# Patient Record
Sex: MALE | Race: WHITE | NOT HISPANIC OR LATINO | Employment: FULL TIME | URBAN - METROPOLITAN AREA
[De-identification: names, ages, dates, MRNs, and addresses within clinical notes are randomized per-mention and may not be internally consistent; named-entity substitution may affect disease eponyms.]

---

## 2024-10-15 ENCOUNTER — APPOINTMENT (OUTPATIENT)
Dept: PRIMARY CARE | Facility: CLINIC | Age: 54
End: 2024-10-15
Payer: COMMERCIAL

## 2024-10-15 VITALS
BODY MASS INDEX: 29.99 KG/M2 | HEART RATE: 68 BPM | WEIGHT: 221.4 LBS | HEIGHT: 72 IN | SYSTOLIC BLOOD PRESSURE: 160 MMHG | DIASTOLIC BLOOD PRESSURE: 82 MMHG | TEMPERATURE: 98.2 F | OXYGEN SATURATION: 95 %

## 2024-10-15 DIAGNOSIS — D17.23 LIPOMA OF RIGHT LOWER EXTREMITY: Primary | ICD-10-CM

## 2024-10-15 PROCEDURE — 3008F BODY MASS INDEX DOCD: CPT | Performed by: FAMILY MEDICINE

## 2024-10-15 PROCEDURE — 99203 OFFICE O/P NEW LOW 30 MIN: CPT | Performed by: FAMILY MEDICINE

## 2024-10-15 ASSESSMENT — PATIENT HEALTH QUESTIONNAIRE - PHQ9
SUM OF ALL RESPONSES TO PHQ9 QUESTIONS 1 AND 2: 0
1. LITTLE INTEREST OR PLEASURE IN DOING THINGS: NOT AT ALL
2. FEELING DOWN, DEPRESSED OR HOPELESS: NOT AT ALL

## 2024-10-15 NOTE — PROGRESS NOTES
"Subjective   Patient ID: Gopal Moreau is a 54 y.o. male who presents for Establish Care and Leg Pain.    HPI     Patient is here to establish care with you. He also complains of a lump on the back of his right leg. He said he has had it years, in the last 6 months it has become painful.     Review of Systems   All other systems reviewed and are negative.      Objective   /82   Pulse 68   Temp 36.8 °C (98.2 °F) (Oral)   Ht 1.816 m (5' 11.5\")   Wt 100 kg (221 lb 6.4 oz)   SpO2 95%   BMI 30.45 kg/m²     Physical Exam  Constitutional:       Appearance: Normal appearance.   Eyes:      Conjunctiva/sclera: Conjunctivae normal.   Cardiovascular:      Rate and Rhythm: Normal rate and regular rhythm.   Pulmonary:      Effort: Pulmonary effort is normal.      Breath sounds: Normal breath sounds.   Abdominal:      Palpations: Abdomen is soft.   Musculoskeletal:         General: Normal range of motion.   Skin:     General: Skin is warm and dry.      Comments: Pt has a large solid subcutaneous mass on the posterior leg consistent with lipoma. This is a mobile, rubbery, non-tender, non-erythematous mass. Size approximately 4-5 cm in diameter   Neurological:      Mental Status: He is alert.   Psychiatric:         Mood and Affect: Mood normal.         Assessment/Plan   Diagnoses and all orders for this visit:  Lipoma of right lower extremity  -     Referral to General Surgery; Future       "

## 2024-10-31 ENCOUNTER — APPOINTMENT (OUTPATIENT)
Facility: CLINIC | Age: 54
End: 2024-10-31
Payer: COMMERCIAL

## 2024-10-31 VITALS
SYSTOLIC BLOOD PRESSURE: 170 MMHG | OXYGEN SATURATION: 98 % | WEIGHT: 224 LBS | HEART RATE: 67 BPM | BODY MASS INDEX: 30.34 KG/M2 | DIASTOLIC BLOOD PRESSURE: 94 MMHG | HEIGHT: 72 IN

## 2024-10-31 DIAGNOSIS — D17.23 LIPOMA OF RIGHT LOWER EXTREMITY: Primary | ICD-10-CM

## 2024-10-31 PROCEDURE — 99204 OFFICE O/P NEW MOD 45 MIN: CPT | Performed by: SURGERY

## 2024-10-31 PROCEDURE — 3008F BODY MASS INDEX DOCD: CPT | Performed by: SURGERY

## 2024-10-31 RX ORDER — CEFAZOLIN SODIUM 2 G/100ML
2 INJECTION, SOLUTION INTRAVENOUS ONCE
OUTPATIENT
Start: 2024-10-31 | End: 2024-10-31

## 2024-10-31 ASSESSMENT — ENCOUNTER SYMPTOMS
PALPITATIONS: 0
NAUSEA: 0
FEVER: 0
HEADACHES: 0
VOMITING: 0
DIZZINESS: 0
DIARRHEA: 0
CHILLS: 0
COUGH: 0
CONSTIPATION: 0
ABDOMINAL PAIN: 0
BLOOD IN STOOL: 0
SHORTNESS OF BREATH: 0

## 2024-11-15 ENCOUNTER — ANESTHESIA EVENT (OUTPATIENT)
Dept: OPERATING ROOM | Facility: HOSPITAL | Age: 54
End: 2024-11-15
Payer: COMMERCIAL

## 2024-11-21 ENCOUNTER — CLINICAL SUPPORT (OUTPATIENT)
Dept: PREADMISSION TESTING | Facility: HOSPITAL | Age: 54
End: 2024-11-21
Payer: COMMERCIAL

## 2024-11-21 NOTE — PREPROCEDURE INSTRUCTIONS
Medication List            Accurate as of November 21, 2024  2:13 PM. Always use your most recent med list.                aspirin-acetaminophen-caffeine 250-250-65 mg tablet  Commonly known as: Excedrin Migraine  Additional Medication Adjustments for Surgery: Take last dose 7 days before surgery                          NPO Instructions:     Do not drink any liquid after midnight the night before your surgery  Do not eat any food after midnight the night before your surgery/procedure.  Candy, gum, mints and smoking of cigarettes, marijuana or vaping is not permitted after midnight prior to your surgery   Do not drink Alcohol 24 hours prior to surgery      Increase fluid intake day before surgery    Additional Instructions:      Review your medication instructions, take indicated medications    Wear  comfortable loose fitting clothing  Do not use moisturizers, creams, lotions or perfume  All jewelry and valuables should be left at home. May bring glasses and partials.    Stop blood thinning medications as instructed by ordering physician or surgeon    Shower or bathe the night before or day of surgery. Use surgical soap instructed per Dr. Maria.    Brush teeth and avoid perfumes, colognes, powders, makeup, aftershave and hair spray    Go to Registration, in the main lobby, upon arrival on the day of surgery and have 's license and medical insurance card available.    Call [phone] the day before your surgery/procedure to find out what time you are to arrive the next day.     Please have a responsible adult to drive you home and be available to help you as needed after surgery.    NPO Instructions:    Do not eat any food after midnight the night before your surgery/procedure.

## 2024-12-05 ENCOUNTER — ANESTHESIA (OUTPATIENT)
Dept: OPERATING ROOM | Facility: HOSPITAL | Age: 54
End: 2024-12-05
Payer: COMMERCIAL

## 2024-12-05 ENCOUNTER — HOSPITAL ENCOUNTER (OUTPATIENT)
Facility: HOSPITAL | Age: 54
Setting detail: OUTPATIENT SURGERY
Discharge: HOME | End: 2024-12-05
Attending: SURGERY | Admitting: SURGERY
Payer: COMMERCIAL

## 2024-12-05 VITALS
HEART RATE: 65 BPM | WEIGHT: 224 LBS | SYSTOLIC BLOOD PRESSURE: 172 MMHG | OXYGEN SATURATION: 100 % | RESPIRATION RATE: 12 BRPM | HEIGHT: 71 IN | TEMPERATURE: 98.4 F | BODY MASS INDEX: 31.36 KG/M2 | DIASTOLIC BLOOD PRESSURE: 94 MMHG

## 2024-12-05 DIAGNOSIS — D17.23 LIPOMA OF RIGHT LOWER EXTREMITY: Primary | ICD-10-CM

## 2024-12-05 LAB
ANION GAP SERPL CALC-SCNC: 14 MMOL/L (ref 10–20)
BUN SERPL-MCNC: 11 MG/DL (ref 6–23)
CALCIUM SERPL-MCNC: 9.5 MG/DL (ref 8.6–10.3)
CHLORIDE SERPL-SCNC: 100 MMOL/L (ref 98–107)
CO2 SERPL-SCNC: 25 MMOL/L (ref 21–32)
CREAT SERPL-MCNC: 0.91 MG/DL (ref 0.5–1.3)
EGFRCR SERPLBLD CKD-EPI 2021: >90 ML/MIN/1.73M*2
ERYTHROCYTE [DISTWIDTH] IN BLOOD BY AUTOMATED COUNT: 12.4 % (ref 11.5–14.5)
GLUCOSE SERPL-MCNC: 99 MG/DL (ref 74–99)
HCT VFR BLD AUTO: 48.8 % (ref 41–52)
HGB BLD-MCNC: 17.6 G/DL (ref 13.5–17.5)
MCH RBC QN AUTO: 30.6 PG (ref 26–34)
MCHC RBC AUTO-ENTMCNC: 36.1 G/DL (ref 32–36)
MCV RBC AUTO: 85 FL (ref 80–100)
NRBC BLD-RTO: 0 /100 WBCS (ref 0–0)
PLATELET # BLD AUTO: 317 X10*3/UL (ref 150–450)
POTASSIUM SERPL-SCNC: 4.1 MMOL/L (ref 3.5–5.3)
RBC # BLD AUTO: 5.76 X10*6/UL (ref 4.5–5.9)
SODIUM SERPL-SCNC: 135 MMOL/L (ref 136–145)
WBC # BLD AUTO: 10.3 X10*3/UL (ref 4.4–11.3)

## 2024-12-05 PROCEDURE — 2500000004 HC RX 250 GENERAL PHARMACY W/ HCPCS (ALT 636 FOR OP/ED): Performed by: NURSE ANESTHETIST, CERTIFIED REGISTERED

## 2024-12-05 PROCEDURE — 7100000002 HC RECOVERY ROOM TIME - EACH INCREMENTAL 1 MINUTE: Performed by: SURGERY

## 2024-12-05 PROCEDURE — 88304 TISSUE EXAM BY PATHOLOGIST: CPT | Performed by: STUDENT IN AN ORGANIZED HEALTH CARE EDUCATION/TRAINING PROGRAM

## 2024-12-05 PROCEDURE — 3600000008 HC OR TIME - EACH INCREMENTAL 1 MINUTE - PROCEDURE LEVEL THREE: Performed by: SURGERY

## 2024-12-05 PROCEDURE — 2500000004 HC RX 250 GENERAL PHARMACY W/ HCPCS (ALT 636 FOR OP/ED): Mod: JZ | Performed by: SURGERY

## 2024-12-05 PROCEDURE — 7100000009 HC PHASE TWO TIME - INITIAL BASE CHARGE: Performed by: SURGERY

## 2024-12-05 PROCEDURE — 12032 INTMD RPR S/A/T/EXT 2.6-7.5: CPT | Performed by: SURGERY

## 2024-12-05 PROCEDURE — 7100000010 HC PHASE TWO TIME - EACH INCREMENTAL 1 MINUTE: Performed by: SURGERY

## 2024-12-05 PROCEDURE — 2500000005 HC RX 250 GENERAL PHARMACY W/O HCPCS: Performed by: SURGERY

## 2024-12-05 PROCEDURE — 2500000004 HC RX 250 GENERAL PHARMACY W/ HCPCS (ALT 636 FOR OP/ED): Performed by: ANESTHESIOLOGY

## 2024-12-05 PROCEDURE — 7100000001 HC RECOVERY ROOM TIME - INITIAL BASE CHARGE: Performed by: SURGERY

## 2024-12-05 PROCEDURE — 36415 COLL VENOUS BLD VENIPUNCTURE: CPT | Performed by: ANESTHESIOLOGY

## 2024-12-05 PROCEDURE — 2500000001 HC RX 250 WO HCPCS SELF ADMINISTERED DRUGS (ALT 637 FOR MEDICARE OP): Performed by: ANESTHESIOLOGY

## 2024-12-05 PROCEDURE — 82374 ASSAY BLOOD CARBON DIOXIDE: CPT | Performed by: ANESTHESIOLOGY

## 2024-12-05 PROCEDURE — 3600000003 HC OR TIME - INITIAL BASE CHARGE - PROCEDURE LEVEL THREE: Performed by: SURGERY

## 2024-12-05 PROCEDURE — 2720000007 HC OR 272 NO HCPCS: Performed by: SURGERY

## 2024-12-05 PROCEDURE — 3700000001 HC GENERAL ANESTHESIA TIME - INITIAL BASE CHARGE: Performed by: SURGERY

## 2024-12-05 PROCEDURE — 85027 COMPLETE CBC AUTOMATED: CPT | Performed by: ANESTHESIOLOGY

## 2024-12-05 PROCEDURE — 88304 TISSUE EXAM BY PATHOLOGIST: CPT | Mod: TC,PORLAB | Performed by: SURGERY

## 2024-12-05 PROCEDURE — 3700000002 HC GENERAL ANESTHESIA TIME - EACH INCREMENTAL 1 MINUTE: Performed by: SURGERY

## 2024-12-05 PROCEDURE — 11406 EXC TR-EXT B9+MARG >4.0 CM: CPT | Performed by: SURGERY

## 2024-12-05 RX ORDER — CEFAZOLIN SODIUM 2 G/100ML
2 INJECTION, SOLUTION INTRAVENOUS ONCE
Status: COMPLETED | OUTPATIENT
Start: 2024-12-05 | End: 2024-12-05

## 2024-12-05 RX ORDER — LIDOCAINE HCL/PF 100 MG/5ML
SYRINGE (ML) INTRAVENOUS AS NEEDED
Status: DISCONTINUED | OUTPATIENT
Start: 2024-12-05 | End: 2024-12-05

## 2024-12-05 RX ORDER — ACETAMINOPHEN 325 MG/1
975 TABLET ORAL ONCE
Status: COMPLETED | OUTPATIENT
Start: 2024-12-05 | End: 2024-12-05

## 2024-12-05 RX ORDER — ACETAMINOPHEN 500 MG
1000 TABLET ORAL EVERY 6 HOURS PRN
Qty: 24 TABLET | Refills: 0 | Status: SHIPPED | OUTPATIENT
Start: 2024-12-05 | End: 2024-12-08

## 2024-12-05 RX ORDER — SODIUM CITRATE AND CITRIC ACID MONOHYDRATE 334; 500 MG/5ML; MG/5ML
30 SOLUTION ORAL ONCE
Status: COMPLETED | OUTPATIENT
Start: 2024-12-05 | End: 2024-12-05

## 2024-12-05 RX ORDER — ONDANSETRON HYDROCHLORIDE 2 MG/ML
4 INJECTION, SOLUTION INTRAVENOUS ONCE
Status: COMPLETED | OUTPATIENT
Start: 2024-12-05 | End: 2024-12-05

## 2024-12-05 RX ORDER — METOCLOPRAMIDE HYDROCHLORIDE 5 MG/ML
10 INJECTION INTRAMUSCULAR; INTRAVENOUS ONCE
Status: COMPLETED | OUTPATIENT
Start: 2024-12-05 | End: 2024-12-05

## 2024-12-05 RX ORDER — PROPOFOL 10 MG/ML
INJECTION, EMULSION INTRAVENOUS AS NEEDED
Status: DISCONTINUED | OUTPATIENT
Start: 2024-12-05 | End: 2024-12-05

## 2024-12-05 RX ORDER — FENTANYL CITRATE 50 UG/ML
INJECTION, SOLUTION INTRAMUSCULAR; INTRAVENOUS AS NEEDED
Status: DISCONTINUED | OUTPATIENT
Start: 2024-12-05 | End: 2024-12-05

## 2024-12-05 RX ORDER — MORPHINE SULFATE 2 MG/ML
2 INJECTION, SOLUTION INTRAMUSCULAR; INTRAVENOUS EVERY 5 MIN PRN
Status: DISCONTINUED | OUTPATIENT
Start: 2024-12-05 | End: 2024-12-05 | Stop reason: HOSPADM

## 2024-12-05 RX ORDER — SODIUM CHLORIDE 9 MG/ML
20 INJECTION, SOLUTION INTRAVENOUS CONTINUOUS
Status: DISCONTINUED | OUTPATIENT
Start: 2024-12-05 | End: 2024-12-05 | Stop reason: HOSPADM

## 2024-12-05 RX ORDER — FAMOTIDINE 10 MG/ML
20 INJECTION INTRAVENOUS ONCE
Status: COMPLETED | OUTPATIENT
Start: 2024-12-05 | End: 2024-12-05

## 2024-12-05 RX ORDER — MIDAZOLAM HYDROCHLORIDE 1 MG/ML
INJECTION, SOLUTION INTRAMUSCULAR; INTRAVENOUS AS NEEDED
Status: DISCONTINUED | OUTPATIENT
Start: 2024-12-05 | End: 2024-12-05

## 2024-12-05 RX ORDER — KETOROLAC TROMETHAMINE 30 MG/ML
INJECTION, SOLUTION INTRAMUSCULAR; INTRAVENOUS AS NEEDED
Status: DISCONTINUED | OUTPATIENT
Start: 2024-12-05 | End: 2024-12-05

## 2024-12-05 RX ORDER — MORPHINE SULFATE 4 MG/ML
4 INJECTION INTRAVENOUS EVERY 5 MIN PRN
Status: DISCONTINUED | OUTPATIENT
Start: 2024-12-05 | End: 2024-12-05 | Stop reason: HOSPADM

## 2024-12-05 RX ORDER — SODIUM CHLORIDE 0.9 G/100ML
IRRIGANT IRRIGATION AS NEEDED
Status: DISCONTINUED | OUTPATIENT
Start: 2024-12-05 | End: 2024-12-05 | Stop reason: HOSPADM

## 2024-12-05 RX ORDER — ALBUTEROL SULFATE 0.83 MG/ML
2.5 SOLUTION RESPIRATORY (INHALATION) ONCE
Status: DISCONTINUED | OUTPATIENT
Start: 2024-12-05 | End: 2024-12-05 | Stop reason: HOSPADM

## 2024-12-05 RX ORDER — ONDANSETRON HYDROCHLORIDE 2 MG/ML
4 INJECTION, SOLUTION INTRAVENOUS ONCE AS NEEDED
Status: DISCONTINUED | OUTPATIENT
Start: 2024-12-05 | End: 2024-12-05 | Stop reason: HOSPADM

## 2024-12-05 SDOH — HEALTH STABILITY: MENTAL HEALTH: CURRENT SMOKER: 1

## 2024-12-05 ASSESSMENT — PAIN SCALES - GENERAL
PAINLEVEL_OUTOF10: 0 - NO PAIN
PAINLEVEL_OUTOF10: 3
PAIN_LEVEL: 0
PAINLEVEL_OUTOF10: 0 - NO PAIN

## 2024-12-05 ASSESSMENT — ENCOUNTER SYMPTOMS
DIZZINESS: 0
BLOOD IN STOOL: 0
NAUSEA: 0
ABDOMINAL PAIN: 0
HEADACHES: 0
COUGH: 0
VOMITING: 0
FEVER: 0
CHILLS: 0
DIARRHEA: 0
CONSTIPATION: 0
SHORTNESS OF BREATH: 0
PALPITATIONS: 0

## 2024-12-05 ASSESSMENT — PAIN DESCRIPTION - DESCRIPTORS: DESCRIPTORS: DISCOMFORT;SORE

## 2024-12-05 ASSESSMENT — COLUMBIA-SUICIDE SEVERITY RATING SCALE - C-SSRS
6. HAVE YOU EVER DONE ANYTHING, STARTED TO DO ANYTHING, OR PREPARED TO DO ANYTHING TO END YOUR LIFE?: NO
2. HAVE YOU ACTUALLY HAD ANY THOUGHTS OF KILLING YOURSELF?: NO
1. IN THE PAST MONTH, HAVE YOU WISHED YOU WERE DEAD OR WISHED YOU COULD GO TO SLEEP AND NOT WAKE UP?: NO

## 2024-12-05 ASSESSMENT — PAIN - FUNCTIONAL ASSESSMENT
PAIN_FUNCTIONAL_ASSESSMENT: 0-10
PAIN_FUNCTIONAL_ASSESSMENT: 0-10

## 2024-12-05 NOTE — ANESTHESIA POSTPROCEDURE EVALUATION
Patient: Gopal Moreau    Procedure Summary       Date: 12/05/24 Room / Location: [facility] OR 02 / Virtual [facility]    Anesthesia Start: 1509 Anesthesia Stop: 1618    Procedure: Excision of right posterior thigh soft tissue mass (Right) Diagnosis:       Lipoma of right lower extremity      (Lipoma of right lower extremity [D17.23])    Surgeons: Francie Maria MD Responsible Provider: TRE Key    Anesthesia Type: MAC ASA Status: 2            Anesthesia Type: MAC    Vitals Value Taken Time   /88 12/05/24 1630   Temp 36.9 °C (98.4 °F) 12/05/24 1620   Pulse 59 12/05/24 1639   Resp 8 12/05/24 1639   SpO2 99 % 12/05/24 1639   Vitals shown include unfiled device data.    Anesthesia Post Evaluation    Patient location during evaluation: PACU  Patient participation: complete - patient participated  Level of consciousness: awake and alert  Pain score: 0  Pain management: adequate  Airway patency: patent  Cardiovascular status: hemodynamically stable  Respiratory status: room air  Hydration status: acceptable  Postoperative Nausea and Vomiting: none    No notable events documented.     Refilled per protocol

## 2024-12-05 NOTE — OP NOTE
Excision of right posterior thigh soft tissue mass Operative Note     Date: 2024  OR Location: [facility]    Name: Gopal Moreau, : 1970, Age: 54 y.o., MRN: [id number], Sex: male    Diagnosis  Pre-op Diagnosis      * Lipoma of right lower extremity [D17.23] Post-op Diagnosis     * Lipoma of right lower extremity [D17.23]     Procedures  Excision of right posterior thigh soft tissue mass  30436 - CO EXC B9 LESION MRGN XCP SK TG T/A/L >4.0 CM  Intermediate wound closure, 6cm    Surgeons      * Francie Maria - Primary    Resident/Fellow/Other Assistant:  Surgeons and Role:     * Dhiraj Hoffman MD - Resident - Assisting    Staff:   Janusz: Abdirizak Coreas Person: Zo Canales Circulator: Isela Canales Circulator: Corina    Anesthesia Staff: CRNA: LIU Key-CRNA    Procedure Summary  Anesthesia: Monitor Anesthesia Care  ASA: II  Estimated Blood Loss: 5mL  Intra-op Medications: Administrations occurring from 1335 to 1435 on 24:  * No intraprocedure medications in log *           Anesthesia Record               Intraprocedure I/O Totals       None           Specimen:   ID Type Source Tests Collected by Time   1 : SOFT TISSUE MASS RIGHT THIGH Tissue SOFT TISSUE MASS RESECTION SURGICAL PATHOLOGY EXAM Francie Maria MD 2024 1547        Findings: soft tissue mass measuring 4.5 x 4 x 3cm    Indications: Gopal Moreau is an 54 y.o. male who is having surgery for Lipoma of right lower extremity [D17.23].     The patient was seen in the preoperative area. The risks, benefits, complications, treatment options, non-operative alternatives, expected recovery and outcomes were discussed with the patient. The possibilities of reaction to medication, pulmonary aspiration, injury to surrounding structures, bleeding, recurrent infection, the need for additional procedures, failure to diagnose a condition, and creating a complication requiring transfusion or operation were discussed with the patient.  The patient concurred with the proposed plan, giving informed consent.  The site of surgery was properly noted/marked if necessary per policy. The patient has been actively warmed in preoperative area. Preoperative antibiotics have been ordered and given within 1 hours of incision. Venous thrombosis prophylaxis have been ordered including bilateral sequential compression devices and chemical prophylaxis    Procedure Details:   The patient was taken to the operating room and underwent monitored anesthesia care. The patient was placed in left lateral decubitus position and all pressure points were padded. The right posterolateral thigh was prepped and draped in the usual sterile fashion. A time-out was performed with all parties present.     Local anesthesia was given using 0.25% Marcaine. A 6cm elliptical incision was made over the mass. Blunt dissection and electrocautery were used to carefully dissect around the mass. It was removed and measured 4.5 x 4 x 3cm in size. The wound bed was carefully irrigated and hemostasis was achieved. The incision was closed with 2-0 and 3-0 Vicryl in an interrupted fashion and 4-0 Vicryl in a running fashion. It was sealed with Liquiband.    The patient was awoken and taken to recovery in stable condition. All needle, sponge and instrument counts were correct at the end of the case. I was present for the entire case.     Complications:  None; patient tolerated the procedure well.    Disposition: PACU - hemodynamically stable.  Condition: stable     Attending Attestation: I was present and scrubbed for the entire procedure.    Francie Maria  Phone Number: [phone]

## 2024-12-05 NOTE — ANESTHESIA PREPROCEDURE EVALUATION
Patient: Gopal Moreau    Procedure Information       Date/Time: 12/05/24 1335    Procedure: Excision of right posterior thigh soft tissue mass (Right) - 30 minutes    Location: [facility] 02 / [facility]    Surgeons: Francie Maria MD            Relevant Problems   Anesthesia (within normal limits)       Clinical information reviewed:   Tobacco  Allergies  Meds  Problems  Med Hx  Surg Hx   Fam Hx  Soc   Hx        NPO Detail:  NPO/Void Status  Date of Last Liquid: 12/04/24  Time of Last Liquid: 2100  Date of Last Solid: 12/04/24  Time of Last Solid: 2100  Last Intake Type: Clear fluids         Physical Exam    Airway  Mallampati: I  TM distance: >3 FB  Neck ROM: full     Cardiovascular - normal exam  Rhythm: regular  Rate: normal     Dental   Comments: MISSING SOME UPPER AND LOWER   Pulmonary - normal exam  Breath sounds clear to auscultation     Abdominal   (+) obese  Abdomen: soft         Anesthesia Plan    History of general anesthesia?: yes  History of complications of general anesthesia?: no    ASA 2     MAC   (PT WITH PREOP IRBB.  NO OTHER EKG FOR COMPARISON.  PT. INSTRUCTED FORCOPY GIVEN TO BE TAKEN TO DR. VILLANUEVA, HIS PCP FOR FOLLOW UP)  The patient is a current smoker.  Patient was not previously instructed to abstain from smoking on day of procedure.  Patient did not smoke on day of procedure.    intravenous induction   Anesthetic plan and risks discussed with patient.  Use of blood products discussed with patient who consented to blood products.    Plan discussed with CRNA.

## 2024-12-05 NOTE — H&P
"GENERAL SURGERY HISTORY AND PHYSICAL    Gopal Moreau   1970   [id number]     History Of Present Illness  Gopal Moreau is a 54 y.o. male presenting for excision of a soft tissue mass.     Past Medical History  He has no past medical history on file.    Surgical History  He has no past surgical history on file.    Medications  No current facility-administered medications on file prior to encounter.     No current outpatient medications on file prior to encounter.       Allergies  Patient has no known allergies.     Social History  He reports that he has been smoking cigarettes. He has a 12.3 pack-year smoking history. He has never used smokeless tobacco. He reports current drug use. Drug: Marijuana. He reports that he does not drink alcohol.    Family History  Family History   Problem Relation Name Age of Onset    Lung cancer Mother          Review of Systems   Constitutional:  Negative for chills and fever.   Respiratory:  Negative for cough and shortness of breath.    Cardiovascular:  Negative for chest pain and palpitations.   Gastrointestinal:  Negative for abdominal pain, blood in stool, constipation, diarrhea, nausea and vomiting.   Neurological:  Negative for dizziness and headaches.   All other systems reviewed and are negative.      Last Recorded Vitals  Blood pressure (!) 146/108, pulse 81, temperature 36.8 °C (98.2 °F), temperature source Temporal, resp. rate 16, height 1.803 m (5' 11\"), weight 102 kg (224 lb), SpO2 97%.     Physical Exam  Constitutional:       General: He is not in acute distress.     Appearance: Normal appearance. He is not ill-appearing.   HENT:      Head: Normocephalic and atraumatic.   Cardiovascular:      Rate and Rhythm: Normal rate and regular rhythm.   Pulmonary:      Effort: Pulmonary effort is normal. No respiratory distress.      Breath sounds: Normal breath sounds.   Abdominal:      General: There is no distension.      Palpations: Abdomen is soft.      Tenderness: " There is no abdominal tenderness. There is no guarding.   Musculoskeletal:         General: No swelling.   Skin:     General: Skin is warm and dry.      Comments: Soft tissue mass right posterior thigh   Neurological:      Mental Status: He is alert and oriented to person, place, and time. Mental status is at baseline.   Psychiatric:         Mood and Affect: Mood normal.         Behavior: Behavior normal.          Relevant Results  Results for orders placed or performed during the hospital encounter of 12/05/24 (from the past 24 hours)   CBC   Result Value Ref Range    WBC 10.3 4.4 - 11.3 x10*3/uL    nRBC 0.0 0.0 - 0.0 /100 WBCs    RBC 5.76 4.50 - 5.90 x10*6/uL    Hemoglobin 17.6 (H) 13.5 - 17.5 g/dL    Hematocrit 48.8 41.0 - 52.0 %    MCV 85 80 - 100 fL    MCH 30.6 26.0 - 34.0 pg    MCHC 36.1 (H) 32.0 - 36.0 g/dL    RDW 12.4 11.5 - 14.5 %    Platelets 317 150 - 450 x10*3/uL   Basic Metabolic Panel   Result Value Ref Range    Glucose 99 74 - 99 mg/dL    Sodium 135 (L) 136 - 145 mmol/L    Potassium 4.1 3.5 - 5.3 mmol/L    Chloride 100 98 - 107 mmol/L    Bicarbonate 25 21 - 32 mmol/L    Anion Gap 14 10 - 20 mmol/L    Urea Nitrogen 11 6 - 23 mg/dL    Creatinine 0.91 0.50 - 1.30 mg/dL    eGFR >90 >60 mL/min/1.73m*2    Calcium 9.5 8.6 - 10.3 mg/dL       No results found.    Assessment and Plan  Principal Problem:    Lipoma of right lower extremity    54 y.o. male who presents for excision of soft tissue mass.    Francie Maria MD, [facility]  General Surgery

## 2024-12-05 NOTE — DISCHARGE INSTRUCTIONS
Your incision is closed with sutures and covered with skin glue. It is okay to shower on 12/6/2024. Let soapy water run over the incision and pat dry. Do not scrub. The skin glue will peel off on its own over a couple of weeks.    Do not submerge in tub or pools until 6-8 weeks post op.     Don't hesitate to reach out to Dr. Maria's office with any questions or concerns.

## 2024-12-06 ASSESSMENT — PAIN SCALES - GENERAL: PAINLEVEL_OUTOF10: 0 - NO PAIN

## 2024-12-16 LAB
LABORATORY COMMENT REPORT: NORMAL
PATH REPORT.FINAL DX SPEC: NORMAL
PATH REPORT.GROSS SPEC: NORMAL
PATH REPORT.RELEVANT HX SPEC: NORMAL
PATH REPORT.TOTAL CANCER: NORMAL

## 2024-12-26 ENCOUNTER — APPOINTMENT (OUTPATIENT)
Facility: CLINIC | Age: 54
End: 2024-12-26
Payer: COMMERCIAL

## 2024-12-26 VITALS
HEIGHT: 71 IN | SYSTOLIC BLOOD PRESSURE: 164 MMHG | OXYGEN SATURATION: 97 % | HEART RATE: 72 BPM | DIASTOLIC BLOOD PRESSURE: 84 MMHG | WEIGHT: 232 LBS | BODY MASS INDEX: 32.48 KG/M2

## 2024-12-26 DIAGNOSIS — Z12.11 COLON CANCER SCREENING: ICD-10-CM

## 2024-12-26 DIAGNOSIS — D17.23 LIPOMA OF RIGHT LOWER EXTREMITY: Primary | ICD-10-CM

## 2024-12-26 PROCEDURE — 99213 OFFICE O/P EST LOW 20 MIN: CPT | Performed by: SURGERY

## 2024-12-26 PROCEDURE — 99024 POSTOP FOLLOW-UP VISIT: CPT | Performed by: SURGERY

## 2024-12-26 PROCEDURE — 3008F BODY MASS INDEX DOCD: CPT | Performed by: SURGERY

## 2024-12-26 ASSESSMENT — ENCOUNTER SYMPTOMS
HEADACHES: 0
FEVER: 0
CONSTIPATION: 0
PALPITATIONS: 0
VOMITING: 0
ABDOMINAL PAIN: 0
COUGH: 0
CHILLS: 0
BLOOD IN STOOL: 0
DIZZINESS: 0
NAUSEA: 0
SHORTNESS OF BREATH: 0
DIARRHEA: 0

## 2024-12-26 NOTE — PROGRESS NOTES
"GENERAL SURGERY CLINIC NOTE    Gopal Moreau   1970   [id number]     History Of Present Illness  Gopal Moreau is a 54 y.o. male who presents to the office after undergoing excision of a right posterior thigh soft tissue mass under MAC 12/5/24. He had some drainage after the glue came off the last few days and has been putting hydrogen peroxide on it daily.      Past Medical History  He has no past medical history on file.    Surgical History  He has no past surgical history on file.  No abdominal surgeries    Medications  Current Outpatient Medications on File Prior to Visit   Medication Sig Dispense Refill    aspirin-acetaminophen-caffeine (Excedrin Migraine) 250-250-65 mg tablet Take 1 tablet by mouth every 6 hours if needed for headaches or mild pain (1 - 3).       No current facility-administered medications on file prior to visit.       Allergies  Patient has no known allergies.     Social History  He reports that he has been smoking cigarettes. He has a 12.3 pack-year smoking history. He has never used smokeless tobacco. He reports current drug use. Drug: Marijuana. He reports that he does not drink alcohol.    Family History  Family History   Problem Relation Name Age of Onset    Lung cancer Mother     No fhx colorectal cancer     Review of Systems   Constitutional:  Negative for chills and fever.   Respiratory:  Negative for cough and shortness of breath.    Cardiovascular:  Negative for chest pain and palpitations.   Gastrointestinal:  Negative for abdominal pain, blood in stool, constipation, diarrhea, nausea and vomiting.   Neurological:  Negative for dizziness and headaches.   All other systems reviewed and are negative.      Last Recorded Vitals  Blood pressure 164/84, pulse 72, height 1.803 m (5' 11\"), weight 105 kg (232 lb), SpO2 97%.     Physical Exam  Constitutional:       General: He is not in acute distress.     Appearance: Normal appearance. He is not ill-appearing.   HENT:      Head: " Normocephalic and atraumatic.   Cardiovascular:      Rate and Rhythm: Normal rate and regular rhythm.   Pulmonary:      Effort: Pulmonary effort is normal. No respiratory distress.      Breath sounds: Normal breath sounds.   Abdominal:      General: There is no distension.      Palpations: Abdomen is soft.      Tenderness: There is no abdominal tenderness. There is no guarding.   Musculoskeletal:         General: No swelling.   Skin:     General: Skin is warm and dry.             Comments: Below the right buttock: the incision shows mild separation with serous drainage on the dressing. There is no significant collection. The skin edges are raw in appearance but not ischemic or infected   Neurological:      Mental Status: He is alert and oriented to person, place, and time. Mental status is at baseline.   Psychiatric:         Mood and Affect: Mood normal.         Behavior: Behavior normal.       Results  Surgical pathology 12/5/24  FINAL DIAGNOSIS   Skin and Soft Tissue, Right Thigh, Excision:  Epidermoid cyst.     Assessment and Plan  54 y.o. male with a large mass on his right posterior thigh status post excision. I reviewed the pathology results with him. After the skin glue came off, the drainage was likely from a postoperative seroma that was collecting. It does not appear infected. I asked him to stop using peroxide as it may impair healing of the raw surface area. The drainage should decrease and the skin will heal.     I discussed undergoing screening colonoscopy. The patient is unable to take time off until February or later due to his job. We will call him in about 1 month to schedule his colonoscopy.     Follow up on an as needed basis. He expressed his understanding and all questions were answered.     Francie Maria MD, [facility]  General Surgery    no difficulties

## 2025-02-04 ENCOUNTER — TELEPHONE (OUTPATIENT)
Dept: SURGERY | Facility: CLINIC | Age: 55
End: 2025-02-04

## 2025-02-04 NOTE — TELEPHONE ENCOUNTER
----- Message from Francie Maria sent at 12/26/2024  9:16 AM EST -----  Please call this patient in mid/late January to schedule his first screening colonoscopy. He can't take any more time off until February or later.    He works 6a-2p - he can drink clear liquids while working the day before the procedure, and start his prep after.

## 2025-06-30 ENCOUNTER — HOSPITAL ENCOUNTER (INPATIENT)
Facility: HOSPITAL | Age: 55
LOS: 2 days | Discharge: HOME | End: 2025-07-03
Attending: EMERGENCY MEDICINE | Admitting: INTERNAL MEDICINE
Payer: COMMERCIAL

## 2025-06-30 DIAGNOSIS — L03.119 CELLULITIS OF UPPER EXTREMITY, UNSPECIFIED LATERALITY: Primary | ICD-10-CM

## 2025-06-30 DIAGNOSIS — I89.1 LYMPHANGITIS: ICD-10-CM

## 2025-06-30 DIAGNOSIS — N17.9 ACUTE KIDNEY INJURY: ICD-10-CM

## 2025-06-30 DIAGNOSIS — I10 PRIMARY HYPERTENSION: ICD-10-CM

## 2025-06-30 LAB
ALBUMIN SERPL BCP-MCNC: 4.6 G/DL (ref 3.4–5)
ALP SERPL-CCNC: 87 U/L (ref 33–120)
ALT SERPL W P-5'-P-CCNC: 15 U/L (ref 10–52)
ANION GAP SERPL CALC-SCNC: 14 MMOL/L (ref 10–20)
AST SERPL W P-5'-P-CCNC: 18 U/L (ref 9–39)
BASOPHILS # BLD AUTO: 0.05 X10*3/UL (ref 0–0.1)
BASOPHILS NFR BLD AUTO: 0.4 %
BILIRUB SERPL-MCNC: 0.7 MG/DL (ref 0–1.2)
BUN SERPL-MCNC: 8 MG/DL (ref 6–23)
CALCIUM SERPL-MCNC: 9.5 MG/DL (ref 8.6–10.3)
CHLORIDE SERPL-SCNC: 100 MMOL/L (ref 98–107)
CO2 SERPL-SCNC: 23 MMOL/L (ref 21–32)
CREAT SERPL-MCNC: 0.94 MG/DL (ref 0.5–1.3)
CRP SERPL-MCNC: 4.84 MG/DL
EGFRCR SERPLBLD CKD-EPI 2021: >90 ML/MIN/1.73M*2
EOSINOPHIL # BLD AUTO: 0.06 X10*3/UL (ref 0–0.7)
EOSINOPHIL NFR BLD AUTO: 0.4 %
ERYTHROCYTE [DISTWIDTH] IN BLOOD BY AUTOMATED COUNT: 12.7 % (ref 11.5–14.5)
ERYTHROCYTE [SEDIMENTATION RATE] IN BLOOD BY WESTERGREN METHOD: 36 MM/H (ref 0–20)
GLUCOSE SERPL-MCNC: 103 MG/DL (ref 74–99)
HCT VFR BLD AUTO: 46.7 % (ref 41–52)
HGB BLD-MCNC: 16.9 G/DL (ref 13.5–17.5)
IMM GRANULOCYTES # BLD AUTO: 0.07 X10*3/UL (ref 0–0.7)
IMM GRANULOCYTES NFR BLD AUTO: 0.5 % (ref 0–0.9)
LYMPHOCYTES # BLD AUTO: 1.84 X10*3/UL (ref 1.2–4.8)
LYMPHOCYTES NFR BLD AUTO: 13.2 %
MCH RBC QN AUTO: 30.3 PG (ref 26–34)
MCHC RBC AUTO-ENTMCNC: 36.2 G/DL (ref 32–36)
MCV RBC AUTO: 84 FL (ref 80–100)
MONOCYTES # BLD AUTO: 0.88 X10*3/UL (ref 0.1–1)
MONOCYTES NFR BLD AUTO: 6.3 %
NEUTROPHILS # BLD AUTO: 11.05 X10*3/UL (ref 1.2–7.7)
NEUTROPHILS NFR BLD AUTO: 79.2 %
NRBC BLD-RTO: 0 /100 WBCS (ref 0–0)
PLATELET # BLD AUTO: 311 X10*3/UL (ref 150–450)
POTASSIUM SERPL-SCNC: 3.5 MMOL/L (ref 3.5–5.3)
PROT SERPL-MCNC: 8.3 G/DL (ref 6.4–8.2)
RBC # BLD AUTO: 5.57 X10*6/UL (ref 4.5–5.9)
SODIUM SERPL-SCNC: 133 MMOL/L (ref 136–145)
WBC # BLD AUTO: 14 X10*3/UL (ref 4.4–11.3)

## 2025-06-30 PROCEDURE — 96366 THER/PROPH/DIAG IV INF ADDON: CPT

## 2025-06-30 PROCEDURE — 99221 1ST HOSP IP/OBS SF/LOW 40: CPT

## 2025-06-30 PROCEDURE — 86780 TREPONEMA PALLIDUM: CPT | Mod: PORLAB | Performed by: NURSE PRACTITIONER

## 2025-06-30 PROCEDURE — 36415 COLL VENOUS BLD VENIPUNCTURE: CPT | Performed by: NURSE PRACTITIONER

## 2025-06-30 PROCEDURE — 96365 THER/PROPH/DIAG IV INF INIT: CPT

## 2025-06-30 PROCEDURE — 99285 EMERGENCY DEPT VISIT HI MDM: CPT | Performed by: EMERGENCY MEDICINE

## 2025-06-30 PROCEDURE — 2500000005 HC RX 250 GENERAL PHARMACY W/O HCPCS: Performed by: NURSE PRACTITIONER

## 2025-06-30 PROCEDURE — 85652 RBC SED RATE AUTOMATED: CPT | Performed by: NURSE PRACTITIONER

## 2025-06-30 PROCEDURE — 85025 COMPLETE CBC W/AUTO DIFF WBC: CPT | Performed by: NURSE PRACTITIONER

## 2025-06-30 PROCEDURE — 2500000004 HC RX 250 GENERAL PHARMACY W/ HCPCS (ALT 636 FOR OP/ED): Performed by: NURSE PRACTITIONER

## 2025-06-30 PROCEDURE — 87040 BLOOD CULTURE FOR BACTERIA: CPT | Mod: PORLAB | Performed by: NURSE PRACTITIONER

## 2025-06-30 PROCEDURE — 87077 CULTURE AEROBIC IDENTIFY: CPT | Mod: PORLAB | Performed by: NURSE PRACTITIONER

## 2025-06-30 PROCEDURE — 86140 C-REACTIVE PROTEIN: CPT | Performed by: NURSE PRACTITIONER

## 2025-06-30 PROCEDURE — 80053 COMPREHEN METABOLIC PANEL: CPT | Performed by: NURSE PRACTITIONER

## 2025-06-30 RX ORDER — POLYETHYLENE GLYCOL 3350 17 G/17G
17 POWDER, FOR SOLUTION ORAL DAILY PRN
Status: DISCONTINUED | OUTPATIENT
Start: 2025-06-30 | End: 2025-07-03 | Stop reason: HOSPADM

## 2025-06-30 RX ORDER — HYDRALAZINE HYDROCHLORIDE 25 MG/1
25 TABLET, FILM COATED ORAL 3 TIMES DAILY PRN
Status: DISCONTINUED | OUTPATIENT
Start: 2025-06-30 | End: 2025-07-03

## 2025-06-30 RX ORDER — ACETAMINOPHEN 325 MG/1
650 TABLET ORAL EVERY 4 HOURS PRN
Status: DISCONTINUED | OUTPATIENT
Start: 2025-06-30 | End: 2025-07-03 | Stop reason: HOSPADM

## 2025-06-30 RX ORDER — TALC
6 POWDER (GRAM) TOPICAL NIGHTLY PRN
Status: DISCONTINUED | OUTPATIENT
Start: 2025-06-30 | End: 2025-07-03 | Stop reason: HOSPADM

## 2025-06-30 RX ORDER — ONDANSETRON HYDROCHLORIDE 2 MG/ML
4 INJECTION, SOLUTION INTRAVENOUS EVERY 6 HOURS PRN
Status: DISCONTINUED | OUTPATIENT
Start: 2025-06-30 | End: 2025-07-03 | Stop reason: HOSPADM

## 2025-06-30 RX ORDER — GUAIFENESIN/DEXTROMETHORPHAN 100-10MG/5
5 SYRUP ORAL EVERY 4 HOURS PRN
Status: DISCONTINUED | OUTPATIENT
Start: 2025-06-30 | End: 2025-07-03 | Stop reason: HOSPADM

## 2025-06-30 RX ORDER — VANCOMYCIN HYDROCHLORIDE 1 G/20ML
INJECTION, POWDER, LYOPHILIZED, FOR SOLUTION INTRAVENOUS DAILY PRN
Status: DISCONTINUED | OUTPATIENT
Start: 2025-06-30 | End: 2025-07-01 | Stop reason: SDUPTHER

## 2025-06-30 RX ADMIN — VANCOMYCIN HYDROCHLORIDE 2000 MG: 10 INJECTION, POWDER, LYOPHILIZED, FOR SOLUTION INTRAVENOUS at 20:00

## 2025-06-30 ASSESSMENT — LIFESTYLE VARIABLES
HAVE PEOPLE ANNOYED YOU BY CRITICIZING YOUR DRINKING: NO
EVER HAD A DRINK FIRST THING IN THE MORNING TO STEADY YOUR NERVES TO GET RID OF A HANGOVER: NO
HAVE YOU EVER FELT YOU SHOULD CUT DOWN ON YOUR DRINKING: NO
TOTAL SCORE: 0
EVER FELT BAD OR GUILTY ABOUT YOUR DRINKING: NO

## 2025-06-30 ASSESSMENT — ENCOUNTER SYMPTOMS
WHEEZING: 0
FEVER: 0
CHEST TIGHTNESS: 0
NAUSEA: 0
PALPITATIONS: 0
DIARRHEA: 0
HEADACHES: 0
ABDOMINAL PAIN: 0
CHILLS: 0
CONSTIPATION: 0
BACK PAIN: 0
JOINT SWELLING: 0
COUGH: 0
FATIGUE: 0
TREMORS: 0
SHORTNESS OF BREATH: 0
FLANK PAIN: 0
VOMITING: 0
WEAKNESS: 0
WOUND: 1
HEMATURIA: 0
COLOR CHANGE: 1

## 2025-06-30 ASSESSMENT — PAIN - FUNCTIONAL ASSESSMENT: PAIN_FUNCTIONAL_ASSESSMENT: 0-10

## 2025-07-01 LAB
ALBUMIN SERPL BCP-MCNC: 3 G/DL (ref 3.4–5)
ALP SERPL-CCNC: 56 U/L (ref 33–120)
ALT SERPL W P-5'-P-CCNC: 10 U/L (ref 10–52)
ANION GAP SERPL CALC-SCNC: 9 MMOL/L (ref 10–20)
AST SERPL W P-5'-P-CCNC: 14 U/L (ref 9–39)
BILIRUB SERPL-MCNC: 0.5 MG/DL (ref 0–1.2)
BUN SERPL-MCNC: 6 MG/DL (ref 6–23)
CALCIUM SERPL-MCNC: 6.5 MG/DL (ref 8.6–10.3)
CHLORIDE SERPL-SCNC: 113 MMOL/L (ref 98–107)
CO2 SERPL-SCNC: 20 MMOL/L (ref 21–32)
CREAT SERPL-MCNC: 0.58 MG/DL (ref 0.5–1.3)
EGFRCR SERPLBLD CKD-EPI 2021: >90 ML/MIN/1.73M*2
ERYTHROCYTE [DISTWIDTH] IN BLOOD BY AUTOMATED COUNT: 12.5 % (ref 11.5–14.5)
GLUCOSE SERPL-MCNC: 88 MG/DL (ref 74–99)
HCT VFR BLD AUTO: 37.1 % (ref 41–52)
HGB BLD-MCNC: 13.1 G/DL (ref 13.5–17.5)
HIV 1+2 AB+HIV1 P24 AG SERPL QL IA: NONREACTIVE
MCH RBC QN AUTO: 30.2 PG (ref 26–34)
MCHC RBC AUTO-ENTMCNC: 35.3 G/DL (ref 32–36)
MCV RBC AUTO: 86 FL (ref 80–100)
NRBC BLD-RTO: 0 /100 WBCS (ref 0–0)
PLATELET # BLD AUTO: 239 X10*3/UL (ref 150–450)
POTASSIUM SERPL-SCNC: 3 MMOL/L (ref 3.5–5.3)
PROT SERPL-MCNC: 5.2 G/DL (ref 6.4–8.2)
RBC # BLD AUTO: 4.34 X10*6/UL (ref 4.5–5.9)
SODIUM SERPL-SCNC: 139 MMOL/L (ref 136–145)
TREPONEMA PALLIDUM IGG+IGM AB [PRESENCE] IN SERUM OR PLASMA BY IMMUNOASSAY: NONREACTIVE
WBC # BLD AUTO: 10.4 X10*3/UL (ref 4.4–11.3)

## 2025-07-01 PROCEDURE — 85027 COMPLETE CBC AUTOMATED: CPT

## 2025-07-01 PROCEDURE — 87077 CULTURE AEROBIC IDENTIFY: CPT | Mod: PORLAB | Performed by: STUDENT IN AN ORGANIZED HEALTH CARE EDUCATION/TRAINING PROGRAM

## 2025-07-01 PROCEDURE — 96367 TX/PROPH/DG ADDL SEQ IV INF: CPT

## 2025-07-01 PROCEDURE — 2500000002 HC RX 250 W HCPCS SELF ADMINISTERED DRUGS (ALT 637 FOR MEDICARE OP, ALT 636 FOR OP/ED): Performed by: INTERNAL MEDICINE

## 2025-07-01 PROCEDURE — 2500000001 HC RX 250 WO HCPCS SELF ADMINISTERED DRUGS (ALT 637 FOR MEDICARE OP): Performed by: INTERNAL MEDICINE

## 2025-07-01 PROCEDURE — 2500000004 HC RX 250 GENERAL PHARMACY W/ HCPCS (ALT 636 FOR OP/ED)

## 2025-07-01 PROCEDURE — 87491 CHLMYD TRACH DNA AMP PROBE: CPT | Mod: PORLAB | Performed by: STUDENT IN AN ORGANIZED HEALTH CARE EDUCATION/TRAINING PROGRAM

## 2025-07-01 PROCEDURE — S4991 NICOTINE PATCH NONLEGEND: HCPCS | Performed by: INTERNAL MEDICINE

## 2025-07-01 PROCEDURE — 2500000004 HC RX 250 GENERAL PHARMACY W/ HCPCS (ALT 636 FOR OP/ED): Performed by: STUDENT IN AN ORGANIZED HEALTH CARE EDUCATION/TRAINING PROGRAM

## 2025-07-01 PROCEDURE — 36415 COLL VENOUS BLD VENIPUNCTURE: CPT

## 2025-07-01 PROCEDURE — 99233 SBSQ HOSP IP/OBS HIGH 50: CPT | Performed by: INTERNAL MEDICINE

## 2025-07-01 PROCEDURE — 87389 HIV-1 AG W/HIV-1&-2 AB AG IA: CPT | Mod: PORLAB | Performed by: STUDENT IN AN ORGANIZED HEALTH CARE EDUCATION/TRAINING PROGRAM

## 2025-07-01 PROCEDURE — 1210000001 HC SEMI-PRIVATE ROOM DAILY

## 2025-07-01 PROCEDURE — 2500000001 HC RX 250 WO HCPCS SELF ADMINISTERED DRUGS (ALT 637 FOR MEDICARE OP)

## 2025-07-01 PROCEDURE — 96366 THER/PROPH/DIAG IV INF ADDON: CPT

## 2025-07-01 PROCEDURE — 84075 ASSAY ALKALINE PHOSPHATASE: CPT

## 2025-07-01 RX ORDER — VANCOMYCIN HYDROCHLORIDE 1 G/20ML
INJECTION, POWDER, LYOPHILIZED, FOR SOLUTION INTRAVENOUS DAILY PRN
Status: DISCONTINUED | OUTPATIENT
Start: 2025-07-01 | End: 2025-07-01

## 2025-07-01 RX ORDER — TURMERIC 400 MG
1 CAPSULE ORAL DAILY
COMMUNITY

## 2025-07-01 RX ORDER — BISMUTH SUBSALICYLATE 262 MG
1 TABLET,CHEWABLE ORAL DAILY
COMMUNITY

## 2025-07-01 RX ORDER — VANCOMYCIN HYDROCHLORIDE 1.5 G/300ML
1500 INJECTION, SOLUTION INTRAVITREAL EVERY 12 HOURS
Status: DISCONTINUED | OUTPATIENT
Start: 2025-07-01 | End: 2025-07-01

## 2025-07-01 RX ORDER — IBUPROFEN 200 MG
1 TABLET ORAL DAILY
Status: DISCONTINUED | OUTPATIENT
Start: 2025-07-01 | End: 2025-07-03 | Stop reason: HOSPADM

## 2025-07-01 RX ORDER — CEFAZOLIN SODIUM 2 G/50ML
2 SOLUTION INTRAVENOUS EVERY 8 HOURS
Status: DISCONTINUED | OUTPATIENT
Start: 2025-07-01 | End: 2025-07-03

## 2025-07-01 RX ORDER — AMLODIPINE BESYLATE 5 MG/1
5 TABLET ORAL DAILY
Status: DISCONTINUED | OUTPATIENT
Start: 2025-07-01 | End: 2025-07-02

## 2025-07-01 RX ADMIN — CEFAZOLIN SODIUM 2 G: 2 SOLUTION INTRAVENOUS at 22:00

## 2025-07-01 RX ADMIN — PIPERACILLIN SODIUM AND TAZOBACTAM SODIUM 3.38 G: 3; .375 INJECTION, SOLUTION INTRAVENOUS at 05:50

## 2025-07-01 RX ADMIN — PIPERACILLIN SODIUM AND TAZOBACTAM SODIUM 3.38 G: 3; .375 INJECTION, SOLUTION INTRAVENOUS at 00:03

## 2025-07-01 RX ADMIN — VANCOMYCIN HYDROCHLORIDE 1.5 G: 1.5 INJECTION, SOLUTION INTRAVITREAL at 08:29

## 2025-07-01 RX ADMIN — ACETAMINOPHEN 650 MG: 325 TABLET ORAL at 08:29

## 2025-07-01 RX ADMIN — NICOTINE 1 PATCH: 21 PATCH, EXTENDED RELEASE TRANSDERMAL at 16:52

## 2025-07-01 RX ADMIN — CEFAZOLIN SODIUM 2 G: 2 SOLUTION INTRAVENOUS at 13:21

## 2025-07-01 RX ADMIN — PIPERACILLIN SODIUM AND TAZOBACTAM SODIUM 3.38 G: 3; .375 INJECTION, SOLUTION INTRAVENOUS at 11:15

## 2025-07-01 RX ADMIN — AMLODIPINE BESYLATE 5 MG: 5 TABLET ORAL at 16:02

## 2025-07-01 SDOH — SOCIAL STABILITY: SOCIAL INSECURITY
WITHIN THE LAST YEAR, HAVE YOU BEEN KICKED, HIT, SLAPPED, OR OTHERWISE PHYSICALLY HURT BY YOUR PARTNER OR EX-PARTNER?: NO

## 2025-07-01 SDOH — SOCIAL STABILITY: SOCIAL INSECURITY: WITHIN THE LAST YEAR, HAVE YOU BEEN AFRAID OF YOUR PARTNER OR EX-PARTNER?: NO

## 2025-07-01 SDOH — SOCIAL STABILITY: SOCIAL INSECURITY: ABUSE: ADULT

## 2025-07-01 SDOH — ECONOMIC STABILITY: FOOD INSECURITY: WITHIN THE PAST 12 MONTHS, THE FOOD YOU BOUGHT JUST DIDN'T LAST AND YOU DIDN'T HAVE MONEY TO GET MORE.: SOMETIMES TRUE

## 2025-07-01 SDOH — ECONOMIC STABILITY: FOOD INSECURITY
WITHIN THE PAST 12 MONTHS, YOU WORRIED THAT YOUR FOOD WOULD RUN OUT BEFORE YOU GOT THE MONEY TO BUY MORE.: SOMETIMES TRUE

## 2025-07-01 SDOH — ECONOMIC STABILITY: INCOME INSECURITY: IN THE PAST 12 MONTHS HAS THE ELECTRIC, GAS, OIL, OR WATER COMPANY THREATENED TO SHUT OFF SERVICES IN YOUR HOME?: NO

## 2025-07-01 SDOH — SOCIAL STABILITY: SOCIAL INSECURITY: WITHIN THE LAST YEAR, HAVE YOU BEEN HUMILIATED OR EMOTIONALLY ABUSED IN OTHER WAYS BY YOUR PARTNER OR EX-PARTNER?: NO

## 2025-07-01 SDOH — SOCIAL STABILITY: SOCIAL INSECURITY
WITHIN THE LAST YEAR, HAVE YOU BEEN RAPED OR FORCED TO HAVE ANY KIND OF SEXUAL ACTIVITY BY YOUR PARTNER OR EX-PARTNER?: NO

## 2025-07-01 SDOH — SOCIAL STABILITY: SOCIAL INSECURITY: HAVE YOU HAD THOUGHTS OF HARMING ANYONE ELSE?: NO

## 2025-07-01 SDOH — SOCIAL STABILITY: SOCIAL INSECURITY: WERE YOU ABLE TO COMPLETE ALL THE BEHAVIORAL HEALTH SCREENINGS?: YES

## 2025-07-01 ASSESSMENT — COGNITIVE AND FUNCTIONAL STATUS - GENERAL
DAILY ACTIVITIY SCORE: 24
MOBILITY SCORE: 24
DAILY ACTIVITIY SCORE: 24
MOBILITY SCORE: 24
PATIENT BASELINE BEDBOUND: NO

## 2025-07-01 ASSESSMENT — PATIENT HEALTH QUESTIONNAIRE - PHQ9
2. FEELING DOWN, DEPRESSED OR HOPELESS: NOT AT ALL
1. LITTLE INTEREST OR PLEASURE IN DOING THINGS: NOT AT ALL
SUM OF ALL RESPONSES TO PHQ9 QUESTIONS 1 & 2: 0

## 2025-07-01 ASSESSMENT — ACTIVITIES OF DAILY LIVING (ADL)
JUDGMENT_ADEQUATE_SAFELY_COMPLETE_DAILY_ACTIVITIES: YES
ADEQUATE_TO_COMPLETE_ADL: YES
PATIENT'S MEMORY ADEQUATE TO SAFELY COMPLETE DAILY ACTIVITIES?: YES
GROOMING: INDEPENDENT
LACK_OF_TRANSPORTATION: NO
FEEDING YOURSELF: INDEPENDENT
HEARING - RIGHT EAR: FUNCTIONAL
BATHING: INDEPENDENT
WALKS IN HOME: INDEPENDENT
DRESSING YOURSELF: INDEPENDENT
HEARING - LEFT EAR: FUNCTIONAL
TOILETING: INDEPENDENT

## 2025-07-01 ASSESSMENT — LIFESTYLE VARIABLES
AUDIT-C TOTAL SCORE: 0
SKIP TO QUESTIONS 9-10: 1
HOW MANY STANDARD DRINKS CONTAINING ALCOHOL DO YOU HAVE ON A TYPICAL DAY: PATIENT DOES NOT DRINK
HOW OFTEN DO YOU HAVE 6 OR MORE DRINKS ON ONE OCCASION: NEVER
AUDIT-C TOTAL SCORE: 0
HOW OFTEN DO YOU HAVE A DRINK CONTAINING ALCOHOL: NEVER

## 2025-07-01 ASSESSMENT — PAIN SCALES - GENERAL
PAINLEVEL_OUTOF10: 0 - NO PAIN
PAINLEVEL_OUTOF10: 0 - NO PAIN

## 2025-07-01 ASSESSMENT — PAIN - FUNCTIONAL ASSESSMENT
PAIN_FUNCTIONAL_ASSESSMENT: 0-10
PAIN_FUNCTIONAL_ASSESSMENT: 0-10

## 2025-07-01 NOTE — CONSULTS
Infectious Disease Inpatient Consult    Inpatient consult to Infectious Diseases  Consult performed by: Onel Chen MD  Consult ordered by: Marisol Mera PA-C        Primary MD: Anish Macias DO    Reason For Consult  Bilateral hand ulcers with lymphangitis/painless penile ulcer      Assessment/Plan:    #Sepsis  #Cellulitis of bilateral upper extremities  Leukocytosis, HR 91. Swab culture positive for gram positive cocci in pairs/chains.  Likely due to beta-hemolytic strep skin and soft tissue infection with autoinoculation, likely complicated by lymphangitis. Repeat swab of left hand is pending. Vancomycin, zosyn discontinued and Cefazolin started.     #Single penile lesion  The penile lesion may represent a secondary autoinoculated site, though other causes must be ruled out. Syphilis panel negative. HIV and STI panel added.    #Coccygeal ulcer  Decubitus ulcer?.  Patient states that he is usually very active and does not spend time laying in the bed.  No signs of infection around the wound    Recommendations:    -Start Cefazolin 2gm q8h  -discontinued Vanco and Zosyn  -Monitor blood cultures, wound cultures  -Check HIV, gonorrhea, chlamydia  -Wound care consulted  -Thank you for consult.  Will continue to follow    Onel Chen MD  Date of service: 7/1/2025  Time of service: 12:49 PM      History Of Present Illness  Gopal Moreau is a 55 y.o. male with no past medical history presents with superficial ulcerations on both hands and a single, painless penile lesion. He reports that the hand lesions began 1-2 weeks ago, initially appearing on his right middle finger and subsequently spreading to the right fourth finger, second knuckle, and left fifth finger. He believes the hand lesions developed prior to the penile ulcer and notes that he had scratched his genital area with his right hand. He applied over-the-counter ointments and hydrogen peroxide without improvement. Over the past few days, he  noticed red streaking up his right upper extremity, prompting evaluation at an urgent care, which referred him to the ED for further work-up.     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 He denies any trauma to the hands, gardening, drug use, or new sexual partners. He is sexually active with his girlfriend, who is asymptomatic. He also denies drainage, fever, chills, dysuria, or pelvic pain.    On admission, vital stable except /94.  Labs showed WBC count 14, Hb 16.9, platelet 311, potassium 3.5, creatinine 0.9, AST ALT normal, CRP 4.8, ESR 36.  Wound swab shows gram-positive cocci in pairs and chains.  Patient started on vancomycin, Zosyn and ID consulted for further antibiotic recommendation    Past Medical History  He has no past medical history on file.    Surgical History  He has no past surgical history on file.     Social History     Occupational History    Not on file   Tobacco Use    Smoking status: Some Days     Current packs/day: 0.25     Average packs/day: 0.3 packs/day for 49.2 years (12.3 ttl pk-yrs)     Types: Cigarettes    Smokeless tobacco: Never   Vaping Use    Vaping status: Every Day    Substances: Nicotine   Substance and Sexual Activity    Alcohol use: Never    Drug use: Not Currently     Types: Marijuana    Sexual activity: Yes     Partners: Female     Birth control/protection: Post-menopausal     Travel History   Travel since 06/01/25    No documented travel since 06/01/25            Pets: unknown  Hobbies: unknown    Family History  Family History[1]  Allergies  Patient has no known allergies.     Immunization History   Administered Date(s) Administered    [facility] SARS-CoV-2 Vaccination 11/18/2021     Medications  Home medications:  Prescriptions Prior to Admission[2]  Current medications:  Scheduled medications  Scheduled Medications[3]  Continuous medications  Continuous Medications[4]  PRN  medications  PRN Medications[5]    Review of Systems  Denies fever/chills, nausea/vomiting, weight loss/gain, fatigue, dizziness, weakness, confusion, headache, numbness/tingling, cough, shortness of breath, chest pain, abdominal pain    Objective  Range of Vitals (last 24 hours)  Heart Rate:  [68-91]   Temperature:  [36.8 °C (98.2 °F)-37.4 °C (99.3 °F)]   Respirations:  [12-16]   BP: (150-182)/()   Height:  [182.9 cm (6')]   Weight:  [95.3 kg (210 lb)]   Pulse Ox:  [92 %-100 %]   Daily Weight  25 : 95.3 kg (210 lb)    Body mass index is 28.48 kg/m².     Physical Exam  /83 (Patient Position: Sitting)   Pulse 68   Temp 36.8 °C (98.2 °F) (Oral)   Resp 12   Ht 1.829 m (6')   Wt 95.3 kg (210 lb)   SpO2 (!) 92%   BMI 28.48 kg/m²   Temp (24hrs), Av.1 °C (98.8 °F), Min:36.8 °C (98.2 °F), Max:37.4 °C (99.3 °F)      General: alert, oriented, NAD  HEENT: No conjunctival pallor, no scleral icterus, no oral ulcers  Lungs: bilaterally clear to auscultation, no wheezing  Heart: regular rate and rhythm, no murmur  Abdomen: soft, non tender, non distended, BS+  Neuro: AAO x 3, PERRL, CN grossly intact    : Superficial ulcer along left side of the penile base. Pic in chart.    Skin: Multiple well-demarcated, shallow ulcerations with erythematous bases noted on the dorsal aspect of the right 3rd/4th digit, 2nd MCP, and left 5th digit. Right arm ascending lymphangitis present, Purulent fluid expressed on palpation with minimal tenderness from the L hand. New, small ulcer location within gluteal cleft     Extremities: no edema, no cyanosis    MSK: No joint inflammation     Relevant Results    Labs  Results from last 72 hours   Lab Units 25  0436 25  1850   WBC AUTO x10*3/uL 10.4 14.0*   HEMOGLOBIN g/dL 13.1* 16.9   HEMATOCRIT % 37.1* 46.7   PLATELETS AUTO x10*3/uL 239 311   NEUTROS PCT AUTO %  --  79.2   LYMPHS PCT AUTO %  --  13.2   MONOS PCT AUTO %  --  6.3   EOS PCT AUTO %  --  0.4  "    Results from last 72 hours   Lab Units 07/01/25  0436 06/30/25  1850   SODIUM mmol/L 139 133*   POTASSIUM mmol/L 3.0* 3.5   CHLORIDE mmol/L 113* 100   CO2 mmol/L 20* 23   BUN mg/dL 6 8   CREATININE mg/dL 0.58 0.94   GLUCOSE mg/dL 88 103*   CALCIUM mg/dL 6.5* 9.5   ANION GAP mmol/L 9* 14   EGFR mL/min/1.73m*2 >90 >90     Results from last 72 hours   Lab Units 07/01/25  0436 06/30/25  1850   ALK PHOS U/L 56 87   BILIRUBIN TOTAL mg/dL 0.5 0.7   PROTEIN TOTAL g/dL 5.2* 8.3*   ALT U/L 10 15   AST U/L 14 18   ALBUMIN g/dL 3.0* 4.6     Estimated Creatinine Clearance: 125 mL/min (by C-G formula based on SCr of 0.58 mg/dL).  C-Reactive Protein   Date Value Ref Range Status   06/30/2025 4.84 (H) <1.00 mg/dL Final     Sedimentation Rate   Date Value Ref Range Status   06/30/2025 36 (H) 0 - 20 mm/h Final     No results found for: \"HIV1X2\", \"HIVCONF\", \"PCIFAN5PX\"  No results found for: \"HEPCABINIT\", \"HEPCAB\", \"HCVPCRQUANT\"    Microbiology  No results found for the last 14 days.      Imaging  No results found.            [1]   Family History  Problem Relation Name Age of Onset    Lung cancer Mother     [2] (Not in a hospital admission)  [3] ceFAZolin, 2 g, intravenous, q8h  [4]    [5] PRN medications: acetaminophen, benzocaine-menthol, dextromethorphan-guaifenesin, hydrALAZINE, melatonin, ondansetron, polyethylene glycol    "

## 2025-07-01 NOTE — PROGRESS NOTES
Vancomycin Dosing by Pharmacy- INITIAL    Gopal Moreau is a 55 y.o. year old male who Pharmacy has been consulted for vancomycin dosing for Cellulitis of upper extremity with lymphangitis. Based on the patient's indication and renal status this patient will be dosed based on a goal AUC of 400-600.     Renal function is currently stable.    Visit Vitals  BP (!) 174/92   Pulse 74   Temp 36.8 °C (98.2 °F) (Oral)   Resp 16        Lab Results   Component Value Date    CREATININE 0.94 2025    CREATININE 0.91 2024        Patient weight is as follows:   Vitals:    25 1657   Weight: 95.3 kg (210 lb)       Cultures:  No results found for the encounter in last 14 days.        No intake/output data recorded.  I/O during current shift:  I/O this shift:  In: 550 [IV Piggyback:550]  Out: -     Temp (24hrs), Av.1 °C (98.8 °F), Min:36.8 °C (98.2 °F), Max:37.4 °C (99.3 °F)         Assessment/Plan     Patient has already been given a loading dose of 2000 mg.  Will initiate vancomycin maintenance, 1500 mg every 12 hours.    This dosing regimen is predicted by [employer]GR[facility] to result in the following pharmacokinetic parameters:  Regimen: 1500 mg IV every 12 hours.  Start time: 08:00 on 2025  Exposure target: AUC24 (range) 400-600 mg/L.hr   SMY11-19: 472 mg/L.hr  AUC24,ss: 534 mg/L.hr  Probability of AUC24 > 400: 80 %  Ctrough,ss: 14 mg/L  Probability of Ctrough,ss > 20: 22 %    Follow-up level will be ordered on  at AM Labs unless clinically indicated sooner.  Will continue to monitor renal function daily while on vancomycin and order serum creatinine at least every 48 hours if not already ordered.  Follow for continued vancomycin needs, clinical response, and signs/symptoms of toxicity.       Melina Ryan, PharmD

## 2025-07-01 NOTE — PROGRESS NOTES
Medication Adjustment    The following medication(s) was/were adjusted for Gopal Moreau per protocol/policy due to indication for the medication .    Medication(s) adjusted:   Zosyn 4.5g q 6 hrs changed to 3.375g q 6 hrs for the indication of cellulitis, skin and soft tissue    Melina Ryan, PharmD

## 2025-07-01 NOTE — PROGRESS NOTES
Gopal Moreau is a 55 y.o. male admitted for Cellulitis of upper extremity, unspecified laterality. Pharmacy reviewed the patient's dyhhi-je-aputctgag medications and allergies for accuracy.    The list below reflects the PTA list prior to pharmacy medication history. A summary a changes to the PTA medication list has been listed below. Please review each medication in order reconciliation for additional clarification and justification.    Source of information:  T2P    Medications added:  Multivitamin every day   Turmeric 400mg every day     Medications modified:    Medications to be removed:    Medications of concern:      Prior to Admission Medications   Prescriptions Last Dose Informant Patient Reported? Taking?   aspirin-acetaminophen-caffeine (Excedrin Migraine) 250-250-65 mg tablet   Yes No   Sig: Take 1 tablet by mouth every 6 hours if needed for headaches or mild pain (1 - 3).      Facility-Administered Medications: None       Carolyn Mackey

## 2025-07-01 NOTE — PROGRESS NOTES
Gopal Moreau is a 55 y.o. male on day 0 of admission presenting with Cellulitis of upper extremity, unspecified laterality.      Subjective      Gopal Moreau is a 55 y.o. male with no significant PMHx presenting with wound on his right hand and his penis.  He stated that there was a wound to the side of his right third fingernails a couple weeks ago and later started developing lesion on his right second finger and left fifth finger and now he notes of a lesion to the lateral aspect of his penis. Patient notes that the lesions are painless and he started noticing the swelling of his right hand with redness tracking up to his arm today.  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 He notes he has been putting on hydrogen peroxide and alcohol swabs without improvement. He denies any new sexual partners, history of STDs.  Denies animal bites, gardening.  Denies fever chills, nausea vomiting, chest pain, short of breath, penile discharge, numbness or motor limitation in his hands.  He is left-hand dominant.  Denies IV drug use.     ED Course:   Vitals on presentation: T 37.4 °C (99.3 °F)  HR 91  BP (!) 182/94  RR 16  O2 98 % None (Room air)  Labs:   CBC with WBC 14.0, Hgb 16.9, Plts 311.   CMP with glucose 103, Na 133, K 3.5, BUN 8, sCr 0.94, alk phos 87, ALT 15, AST 18, bilirubin 0.7.   CRP 4.84, sed rate 36  EKG: None  Imaging - agree with radiology interpretation(s): None  Interventions: Started on Zosyn, vancomycin, admission for further management  7/1: Patient was seen and examined.  Redness and swelling of the right hand is improving today.  Blood cultures are still pending.  Continue current IV antibiotics.  Seen by ID who has added gonorrhea, chlamydia and HIV screening.  Will continue to trend CBC and BMP daily        Objective     Last Recorded Vitals  /83 (Patient Position: Sitting)   Pulse 70   Temp 36.8 °C (98.2 °F)  (Oral)   Resp 19   Wt 95.3 kg (210 lb)   SpO2 95%   Intake/Output last 3 Shifts:    Intake/Output Summary (Last 24 hours) at 7/1/2025 1445  Last data filed at 7/1/2025 0033  Gross per 24 hour   Intake 550 ml   Output --   Net 550 ml       Admission Weight  Weight: 95.3 kg (210 lb) (06/30/25 1657)    Daily Weight  06/30/25 : 95.3 kg (210 lb)    Image Results  No image results found.      Physical Exam  Vitals:    07/01/25 1400   BP:    Pulse: 70   Resp: 19   Temp:    SpO2: 95%   Constitutional: Pleasant and cooperative. Laying in bed in no acute distress. Conversant.   Skin: Warm and dry; open wounds noted in medial aspect of right third distal phalanx, right second proximal phalanx and MTP joint and left fifth middle phalanx.  See media section for detail image.  Eyes: EOMI. Anicteric sclera.   ENT: Mucous membranes moist; no obvious injury or deformity appreciated.   Head and Neck: Normocephalic, atraumatic. ROM preserved. Trachea midline. No appreciable JVD.   Respiratory: Nonlabored on RA. Lungs clear to auscultation bilaterally without obvious adventitious sounds. Chest rise is equal.  Cardiovascular: RRR. No gross murmur, gallop, or rub. Extremities are warm and well-perfused with good capillary refill (< 3 seconds). No chest wall tenderness.   GI: Abdomen soft, nontender, nondistended. No obvious organomegaly appreciated. Bowel sounds are present.  : Unable to exam, cannot find chaperone at the time  MSK: No gross abnormalities appreciated. No limitations to AROM/PROM appreciated.   Extremities: No cyanosis, edema, or clubbing evident. Neurovascularly intact.   Neuro: A&Ox3. CN 2-12 grossly intact. Able to respond to questions appropriately and clearly. No acute focal neurologic deficits appreciated.  Psych: Appropriate mood and behavior.    Relevant Results                              Assessment & Plan  Cellulitis of upper extremity, unspecified laterality      Cellulitis of upper extremity with  lymphangitis  Painless ulcer in left lateral penis  -elevated CRP/ESR, leukocytosis  - Blood culture positive for gram-positive cocci in pairs  -IV Zosyn and vancomycin discontinued started on IV cefazolin  -Syphilis screen, blood cultures x 2, tissue/wound culture pending  -HIV gonorrhea chlamydia ordered  -Blood cultures ordered and pending  -ID on board and recommendations appreciated  -Does not meet sepsis criteria admission     Hypertensive urgency  -Currently not on home meds, BP poorly controlled  -hydralazine PRN with parameters ordered  -continue to monitor     Diet: Regular  DVT Prophylaxis: None indicated by guidelines  Code Status: Full Code   Additional Sources Reviewed: ED note day of admission     Anticipated Length of Stay (LOS): Patient will require one-to-two midnight stay for further evaluation and management, pending results of above and/or input from consultants.              Spent 35 minutes in the follow-up management of this patient today    Eusebio Salcido MD

## 2025-07-01 NOTE — CARE PLAN
Patient arrived to unit from ED. Wounds assessed, measured, cleansed. Wound care nurse to assess tomorrow as able. Nicotine patch ordered and applied to left shoulder. Pt compliant with call light and care. Bed in low position with call light in reach. All needs addressed    The patient's goals for the shift include  remaining pain free    The clinical goals for the shift include no falls      Problem: Pain - Adult  Goal: Verbalizes/displays adequate comfort level or baseline comfort level  Outcome: Progressing  Flowsheets (Taken 7/1/2025 8240)  Verbalizes/displays adequate comfort level or baseline comfort level:   Encourage patient to monitor pain and request assistance   Assess pain using appropriate pain scale   Administer analgesics based on type and severity of pain and evaluate response   Implement non-pharmacological measures as appropriate and evaluate response

## 2025-07-01 NOTE — ED PROVIDER NOTES
HPI   Chief Complaint   Patient presents with    Wound Check     PT has a wound on his right hand and left pinky as well as on his penis. Right hand wound has what appears to be either streaking or  redness running up his arm.       This is a 55-year-old  male that is presenting to the emergency room with complaints of wounds to his bilateral hands and his penis.  The patient states that it started with a wound to the side of his right third fingernail a couple of weeks ago.  The patient states that he developed lesions on his right second finger and his left fifth finger.  The patient also reports that he noticed a lesion to the lateral aspect of his penis.  Patient reports that the wounds are not painful.  He denies any new exposures.  He works as a  at a nursing home.  Denies any fevers, chills, nausea, vomiting.  The patient became concerned today because he noticed red streaking up his right arm.  The patient is not diabetic.  Reports that he is generally healthy.  Patient denies any new sexual partners.  He is not have any penile discharge.  Denies any history of STDs.      History provided by:  Patient   used: No                          Cable Coma Scale Score: 15                  Patient History   Medical History[1]  Surgical History[2]  Family History[3]  Social History[4]    Physical Exam   Visit Vitals  BP (!) 173/101   Pulse 74   Temp 37.4 °C (99.3 °F)   Resp 16   Ht 1.829 m (6')   Wt 95.3 kg (210 lb)   SpO2 98%   BMI 28.48 kg/m²   Smoking Status Some Days   BSA 2.2 m²      Physical Exam  Vitals and nursing note reviewed. Exam conducted with a chaperone present (Dr. Martinez).   HENT:      Head: Normocephalic.      Right Ear: External ear normal.      Left Ear: External ear normal.      Nose: Nose normal.      Mouth/Throat:      Pharynx: Oropharynx is clear.   Eyes:      Conjunctiva/sclera: Conjunctivae normal.   Cardiovascular:      Rate and Rhythm: Normal  rate and regular rhythm.      Pulses: Normal pulses.      Heart sounds: Normal heart sounds.   Pulmonary:      Effort: Pulmonary effort is normal.      Breath sounds: Normal breath sounds.   Abdominal:      General: Bowel sounds are normal.      Palpations: Abdomen is soft.   Genitourinary:     Penis: Circumcised.       Testes: Normal.       Musculoskeletal:         General: Normal range of motion.      Cervical back: Normal range of motion.   Skin:     General: Skin is warm.      Findings: Lesion present.      Comments: The patient has open wounds noted to the medial aspect of the right third distal phalanx, the right second proximal phalanx and MTP joint, and the left fifth middle phalanx.   Neurological:      General: No focal deficit present.      Mental Status: He is alert.   Psychiatric:         Mood and Affect: Mood normal.         No orders to display       Labs Reviewed   CBC WITH AUTO DIFFERENTIAL - Abnormal       Result Value    WBC 14.0 (*)     nRBC 0.0      RBC 5.57      Hemoglobin 16.9      Hematocrit 46.7      MCV 84      MCH 30.3      MCHC 36.2 (*)     RDW 12.7      Platelets 311      Neutrophils % 79.2      Immature Granulocytes %, Automated 0.5      Lymphocytes % 13.2      Monocytes % 6.3      Eosinophils % 0.4      Basophils % 0.4      Neutrophils Absolute 11.05 (*)     Immature Granulocytes Absolute, Automated 0.07      Lymphocytes Absolute 1.84      Monocytes Absolute 0.88      Eosinophils Absolute 0.06      Basophils Absolute 0.05     COMPREHENSIVE METABOLIC PANEL - Abnormal    Glucose 103 (*)     Sodium 133 (*)     Potassium 3.5      Chloride 100      Bicarbonate 23      Anion Gap 14      Urea Nitrogen 8      Creatinine 0.94      eGFR >90      Calcium 9.5      Albumin 4.6      Alkaline Phosphatase 87      Total Protein 8.3 (*)     AST 18      Bilirubin, Total 0.7      ALT 15     C-REACTIVE PROTEIN - Abnormal    C-Reactive Protein 4.84 (*)    SEDIMENTATION RATE, AUTOMATED - Abnormal     Sedimentation Rate 36 (*)    BLOOD CULTURE   BLOOD CULTURE   TISSUE/WOUND CULTURE/SMEAR   SYPHILIS SCREENING WITH REFLEX         ED Course & MDM            Medical Decision Making  The patient was seen and evaluated with the attending physician, Dr. Martinez.  The patient is presenting to the emergency room with complaints of open wounds to the bilateral hands with lymphangitis noted to the right arm.  Patient has a painless ulcer noted to the left lateral penis.  There is concerned that the patient transferred something to the penis versus possible STDs such as syphilis.  No saline lock was established.  2 sets of blood cultures and wound culture was obtained and results are pending at the time of dictation.  The patient was given vancomycin and Zosyn for antibiotic coverage.  Laboratory studies revealed that the patient has a white count of 14,000 and an elevated sed rate of 36 and C-reactive protein 4.84.  CMP is largely unremarkable.           Your medication list        ASK your doctor about these medications        Instructions Last Dose Given Next Dose Due   aspirin-acetaminophen-caffeine 250-250-65 mg tablet  Commonly known as: Excedrin Migraine                    Procedure       *This report was transcribed using voice recognition software.  Every effort was made to ensure accuracy; however, inadvertent computerized transcription errors may be present.*  Corina Mendoza APRN-CNP  06/30/25           [1] No past medical history on file.  [2] No past surgical history on file.  [3]   Family History  Problem Relation Name Age of Onset    Lung cancer Mother     [4]   Social History  Tobacco Use    Smoking status: Some Days     Current packs/day: 0.25     Average packs/day: 0.3 packs/day for 49.2 years (12.3 ttl pk-yrs)     Types: Cigarettes    Smokeless tobacco: Never   Vaping Use    Vaping status: Every Day    Substances: Nicotine   Substance Use Topics    Alcohol use: Never    Drug use: Not Currently     Types:  Marijuana        Corina Mendoza, APRN-CNP  06/30/25 8351

## 2025-07-01 NOTE — H&P
[facility] - GENERAL MEDICINE HISTORY AND PHYSICAL    HISTORY OF PRESENT ILLNESS     History Obtained From (Primary Source): Patient  Collateral History (Secondary Sources): D/w ED    History Of Present Illness (HPI):  Gopal Moreau is a 55 y.o. male with no significant PMHx presenting with wound on his right hand and his penis.  He stated that there was a wound to the side of his right third fingernails a couple weeks ago and later started developing lesion on his right second finger and left fifth finger and now he notes of a lesion to the lateral aspect of his penis. Patient notes that the lesions are painless and he started noticing the swelling of his right hand with redness tracking up to his arm today.  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 He notes he has been putting on hydrogen peroxide and alcohol swabs without improvement. He denies any new sexual partners, history of STDs.  Denies animal bites, gardening.  Denies fever chills, nausea vomiting, chest pain, short of breath, penile discharge, numbness or motor limitation in his hands.  He is left-hand dominant.  Denies IV drug use.    ED Course:   Vitals on presentation: T 37.4 °C (99.3 °F)  HR 91  BP (!) 182/94  RR 16  O2 98 % None (Room air)  Labs:   CBC with WBC 14.0, Hgb 16.9, Plts 311.   CMP with glucose 103, Na 133, K 3.5, BUN 8, sCr 0.94, alk phos 87, ALT 15, AST 18, bilirubin 0.7.   CRP 4.84, sed rate 36  EKG: None  Imaging - agree with radiology interpretation(s): None  Interventions: Started on Zosyn, vancomycin, admission for further management    12-point ROS reviewed and found to be negative aside from aforementioned positives in HPI and/or noted in dedicated ROS section below.     Decision made to admit the patient to the hospitalist service after evaluation of the patient, review of the above, and discussion with ED provider.     LABS AND IMAGING      I have personally reviewed the following labs from 06/30/25: CBC, CMP, ESR, and CRP  I have personally reviewed the following imaging studies from 06/30/25: none, with my personal interpretations as documented in ED course above.   I have personally reviewed any obtained EKGs on 06/30/25, with my interpretation as listed above in the ED summary course.   I have personally reviewed the patient's vitals on presentation to the ED and any/all changes through to time of admission (on 06/30/25).     ED Course (From ED Provider):  Diagnoses as of 06/30/25 2332   Cellulitis of upper extremity, unspecified laterality   Lymphangitis     Relevant Results  Results for orders placed or performed during the hospital encounter of 06/30/25 (from the past 24 hours)   CBC and Auto Differential   Result Value Ref Range    WBC 14.0 (H) 4.4 - 11.3 x10*3/uL    nRBC 0.0 0.0 - 0.0 /100 WBCs    RBC 5.57 4.50 - 5.90 x10*6/uL    Hemoglobin 16.9 13.5 - 17.5 g/dL    Hematocrit 46.7 41.0 - 52.0 %    MCV 84 80 - 100 fL    MCH 30.3 26.0 - 34.0 pg    MCHC 36.2 (H) 32.0 - 36.0 g/dL    RDW 12.7 11.5 - 14.5 %    Platelets 311 150 - 450 x10*3/uL    Neutrophils % 79.2 40.0 - 80.0 %    Immature Granulocytes %, Automated 0.5 0.0 - 0.9 %    Lymphocytes % 13.2 13.0 - 44.0 %    Monocytes % 6.3 2.0 - 10.0 %    Eosinophils % 0.4 0.0 - 6.0 %    Basophils % 0.4 0.0 - 2.0 %    Neutrophils Absolute 11.05 (H) 1.20 - 7.70 x10*3/uL    Immature Granulocytes Absolute, Automated 0.07 0.00 - 0.70 x10*3/uL    Lymphocytes Absolute 1.84 1.20 - 4.80 x10*3/uL    Monocytes Absolute 0.88 0.10 - 1.00 x10*3/uL    Eosinophils Absolute 0.06 0.00 - 0.70 x10*3/uL    Basophils Absolute 0.05 0.00 - 0.10 x10*3/uL   Comprehensive metabolic panel   Result Value Ref Range    Glucose 103 (H) 74 - 99 mg/dL    Sodium 133 (L) 136 - 145 mmol/L    Potassium 3.5 3.5 - 5.3 mmol/L    Chloride 100 98 - 107 mmol/L    Bicarbonate 23 21 - 32 mmol/L    Anion Gap 14 10 - 20 mmol/L    Urea Nitrogen 8  6 - 23 mg/dL    Creatinine 0.94 0.50 - 1.30 mg/dL    eGFR >90 >60 mL/min/1.73m*2    Calcium 9.5 8.6 - 10.3 mg/dL    Albumin 4.6 3.4 - 5.0 g/dL    Alkaline Phosphatase 87 33 - 120 U/L    Total Protein 8.3 (H) 6.4 - 8.2 g/dL    AST 18 9 - 39 U/L    Bilirubin, Total 0.7 0.0 - 1.2 mg/dL    ALT 15 10 - 52 U/L   C-Reactive Protein   Result Value Ref Range    C-Reactive Protein 4.84 (H) <1.00 mg/dL   Sedimentation Rate   Result Value Ref Range    Sedimentation Rate 36 (H) 0 - 20 mm/h      Imaging  No results found.    Cardiology, Vascular, and Other Imaging  No other imaging results found for the past 2 days       PAST HISTORIES AND ALLERGIES     Past Medical History  He has no past medical history on file.    Surgical History  He has no past surgical history on file.     Social History  He reports that he has been smoking cigarettes. He has a 12.3 pack-year smoking history. He has never used smokeless tobacco. He reports that he does not currently use drugs after having used the following drugs: Marijuana. He reports that he does not drink alcohol.    Family History  Family History[1]    Allergies  Patient has no known allergies.    MEDICATIONS     Scheduled Medications:  Scheduled Medications[2]  Continuous Medications:  Continuous Medications[3]  PRN Medications:  PRN Medications[4]     REVIEW OF SYSTEMS     Review of Systems   Constitutional:  Negative for chills, fatigue and fever.   Respiratory:  Negative for cough, chest tightness, shortness of breath and wheezing.    Cardiovascular:  Negative for chest pain, palpitations and leg swelling.   Gastrointestinal:  Negative for abdominal pain, constipation, diarrhea, nausea and vomiting.   Genitourinary:  Negative for flank pain and hematuria.   Musculoskeletal:  Negative for back pain and joint swelling.   Skin:  Positive for color change and wound. Negative for rash.   Neurological:  Negative for tremors, syncope, weakness and headaches.       OBJECTIVE     Last  Recorded Vitals  BP (!) 173/101   Pulse 74   Temp 37.4 °C (99.3 °F)   Resp 16   Wt 95.3 kg (210 lb)   SpO2 98%      Physical Exam:  Vital signs and nursing notes reviewed.   Constitutional: Pleasant and cooperative. Laying in bed in no acute distress. Conversant.   Skin: Warm and dry; open wounds noted in medial aspect of right third distal phalanx, right second proximal phalanx and MTP joint and left fifth middle phalanx.  See media section for detail image.  Eyes: EOMI. Anicteric sclera.   ENT: Mucous membranes moist; no obvious injury or deformity appreciated.   Head and Neck: Normocephalic, atraumatic. ROM preserved. Trachea midline. No appreciable JVD.   Respiratory: Nonlabored on RA. Lungs clear to auscultation bilaterally without obvious adventitious sounds. Chest rise is equal.  Cardiovascular: RRR. No gross murmur, gallop, or rub. Extremities are warm and well-perfused with good capillary refill (< 3 seconds). No chest wall tenderness.   GI: Abdomen soft, nontender, nondistended. No obvious organomegaly appreciated. Bowel sounds are present.  : Unable to exam, cannot find chaperone at the time  MSK: No gross abnormalities appreciated. No limitations to AROM/PROM appreciated.   Extremities: No cyanosis, edema, or clubbing evident. Neurovascularly intact.   Neuro: A&Ox3. CN 2-12 grossly intact. Able to respond to questions appropriately and clearly. No acute focal neurologic deficits appreciated.  Psych: Appropriate mood and behavior.    ASSESSMENT AND PLAN   Assessment/Plan     55 y.o. male with PMHx s/f no significant PMHx presenting with wound on his right hand and his penis.     Cellulitis of upper extremity with lymphangitis  Painless ulcer in left lateral penis  -elevated CRP/ESR, leukocytosis  -Continue IV Zosyn and vancomycin  -Syphilis screen, blood cultures x 2, tissue/wound culture pending  -ID consult  -Does not meet sepsis criteria admission    Hypertensive urgency  -Currently not on home  meds, BP poorly controlled  -hydralazine PRN with parameters ordered  -continue to monitor    Diet: Regular  DVT Prophylaxis: None indicated by guidelines  Code Status: Full Code   Case Discussed With: ED provider  Additional Sources Reviewed: ED note day of admission    Anticipated Length of Stay (LOS): Patient will require one-to-two midnight stay for further evaluation and management, pending results of above and/or input from consultants.      Marisol Mera PA-C    Dragon dictation software was used to dictate this note and thus there may be minor errors in translation/transcription including garbled speech or misspellings. Please contact for clarification if needed.         [1]   Family History  Problem Relation Name Age of Onset    Lung cancer Mother     [2] piperacillin-tazobactam, 3.375 g, intravenous, Once  [START ON 7/1/2025] piperacillin-tazobactam, 4.5 g, intravenous, q6h    [3]    [4] PRN medications: acetaminophen, benzocaine-menthol, dextromethorphan-guaifenesin, melatonin, ondansetron, polyethylene glycol, vancomycin

## 2025-07-01 NOTE — PROGRESS NOTES
Vancomycin Dosing by Pharmacy- Cessation of Therapy    Consult to pharmacy for vancomycin dosing has been discontinued by the prescriber, pharmacy will sign off at this time.    Please call pharmacy if there are further questions or re-enter a consult if vancomycin is resumed.     Daryl Casper, PharmD

## 2025-07-02 ENCOUNTER — APPOINTMENT (OUTPATIENT)
Dept: RADIOLOGY | Facility: HOSPITAL | Age: 55
End: 2025-07-02
Payer: COMMERCIAL

## 2025-07-02 ENCOUNTER — DOCUMENTATION (OUTPATIENT)
Dept: INPATIENT UNIT | Facility: HOSPITAL | Age: 55
End: 2025-07-02

## 2025-07-02 LAB
ALBUMIN SERPL BCP-MCNC: 3.9 G/DL (ref 3.4–5)
ALP SERPL-CCNC: 81 U/L (ref 33–120)
ALT SERPL W P-5'-P-CCNC: 11 U/L (ref 10–52)
ANION GAP SERPL CALC-SCNC: 12 MMOL/L (ref 10–20)
ANION GAP SERPL CALC-SCNC: 14 MMOL/L (ref 10–20)
APPEARANCE UR: CLEAR
AST SERPL W P-5'-P-CCNC: 20 U/L (ref 9–39)
BILIRUB SERPL-MCNC: 0.5 MG/DL (ref 0–1.2)
BILIRUB UR STRIP.AUTO-MCNC: NEGATIVE MG/DL
BUN SERPL-MCNC: 14 MG/DL (ref 6–23)
BUN SERPL-MCNC: 15 MG/DL (ref 6–23)
C TRACH RRNA SPEC QL NAA+PROBE: NEGATIVE
CALCIUM SERPL-MCNC: 9.1 MG/DL (ref 8.6–10.3)
CALCIUM SERPL-MCNC: 9.6 MG/DL (ref 8.6–10.3)
CHLORIDE SERPL-SCNC: 104 MMOL/L (ref 98–107)
CHLORIDE SERPL-SCNC: 104 MMOL/L (ref 98–107)
CO2 SERPL-SCNC: 20 MMOL/L (ref 21–32)
CO2 SERPL-SCNC: 22 MMOL/L (ref 21–32)
COLOR UR: COLORLESS
CREAT SERPL-MCNC: 1.78 MG/DL (ref 0.5–1.3)
CREAT SERPL-MCNC: 1.8 MG/DL (ref 0.5–1.3)
CREAT UR-MCNC: 31.8 MG/DL (ref 20–370)
CREAT UR-MCNC: 31.8 MG/DL (ref 20–370)
EGFRCR SERPLBLD CKD-EPI 2021: 44 ML/MIN/1.73M*2
EGFRCR SERPLBLD CKD-EPI 2021: 45 ML/MIN/1.73M*2
EOSINOPHIL SMEAR: ABNORMAL
GLUCOSE SERPL-MCNC: 110 MG/DL (ref 74–99)
GLUCOSE SERPL-MCNC: 119 MG/DL (ref 74–99)
GLUCOSE UR STRIP.AUTO-MCNC: NORMAL MG/DL
KETONES UR STRIP.AUTO-MCNC: NEGATIVE MG/DL
LEUKOCYTE ESTERASE UR QL STRIP.AUTO: NEGATIVE
N GONORRHOEA DNA SPEC QL PROBE+SIG AMP: NEGATIVE
NITRITE UR QL STRIP.AUTO: NEGATIVE
PH UR STRIP.AUTO: 6.5 [PH]
POTASSIUM SERPL-SCNC: 3.8 MMOL/L (ref 3.5–5.3)
POTASSIUM SERPL-SCNC: 4 MMOL/L (ref 3.5–5.3)
PROT SERPL-MCNC: 7.5 G/DL (ref 6.4–8.2)
PROT UR STRIP.AUTO-MCNC: NEGATIVE MG/DL
RBC # UR STRIP.AUTO: ABNORMAL MG/DL
RBC #/AREA URNS AUTO: NORMAL /HPF
SODIUM SERPL-SCNC: 134 MMOL/L (ref 136–145)
SODIUM SERPL-SCNC: 134 MMOL/L (ref 136–145)
SODIUM UR-SCNC: 58 MMOL/L
SODIUM/CREAT UR-RTO: 182 MMOL/G CREAT
SP GR UR STRIP.AUTO: 1
UREA/CREAT UR-SRTO: 4.8 G/G CREAT
UROBILINOGEN UR STRIP.AUTO-MCNC: NORMAL MG/DL
UUN UR-MCNC: 153 MG/DL
WBC #/AREA URNS AUTO: NORMAL /HPF

## 2025-07-02 PROCEDURE — 76770 US EXAM ABDO BACK WALL COMP: CPT

## 2025-07-02 PROCEDURE — 2500000002 HC RX 250 W HCPCS SELF ADMINISTERED DRUGS (ALT 637 FOR MEDICARE OP, ALT 636 FOR OP/ED): Performed by: INTERNAL MEDICINE

## 2025-07-02 PROCEDURE — 36415 COLL VENOUS BLD VENIPUNCTURE: CPT | Performed by: INTERNAL MEDICINE

## 2025-07-02 PROCEDURE — 2500000001 HC RX 250 WO HCPCS SELF ADMINISTERED DRUGS (ALT 637 FOR MEDICARE OP): Performed by: INTERNAL MEDICINE

## 2025-07-02 PROCEDURE — 80048 BASIC METABOLIC PNL TOTAL CA: CPT | Mod: CCI | Performed by: INTERNAL MEDICINE

## 2025-07-02 PROCEDURE — 82570 ASSAY OF URINE CREATININE: CPT | Performed by: INTERNAL MEDICINE

## 2025-07-02 PROCEDURE — 84540 ASSAY OF URINE/UREA-N: CPT | Performed by: INTERNAL MEDICINE

## 2025-07-02 PROCEDURE — 2500000004 HC RX 250 GENERAL PHARMACY W/ HCPCS (ALT 636 FOR OP/ED): Performed by: INTERNAL MEDICINE

## 2025-07-02 PROCEDURE — 1210000001 HC SEMI-PRIVATE ROOM DAILY

## 2025-07-02 PROCEDURE — 99233 SBSQ HOSP IP/OBS HIGH 50: CPT | Performed by: INTERNAL MEDICINE

## 2025-07-02 PROCEDURE — S4991 NICOTINE PATCH NONLEGEND: HCPCS | Performed by: INTERNAL MEDICINE

## 2025-07-02 PROCEDURE — 80053 COMPREHEN METABOLIC PANEL: CPT | Performed by: INTERNAL MEDICINE

## 2025-07-02 PROCEDURE — 84300 ASSAY OF URINE SODIUM: CPT | Performed by: INTERNAL MEDICINE

## 2025-07-02 PROCEDURE — 2500000004 HC RX 250 GENERAL PHARMACY W/ HCPCS (ALT 636 FOR OP/ED): Performed by: STUDENT IN AN ORGANIZED HEALTH CARE EDUCATION/TRAINING PROGRAM

## 2025-07-02 PROCEDURE — 81001 URINALYSIS AUTO W/SCOPE: CPT | Performed by: INTERNAL MEDICINE

## 2025-07-02 PROCEDURE — 89190 NASAL SMEAR FOR EOSINOPHILS: CPT | Mod: PORLAB | Performed by: INTERNAL MEDICINE

## 2025-07-02 RX ORDER — FAMOTIDINE 20 MG/1
20 TABLET, FILM COATED ORAL 2 TIMES DAILY
Status: DISCONTINUED | OUTPATIENT
Start: 2025-07-02 | End: 2025-07-03 | Stop reason: HOSPADM

## 2025-07-02 RX ORDER — SODIUM CHLORIDE, SODIUM LACTATE, POTASSIUM CHLORIDE, CALCIUM CHLORIDE 600; 310; 30; 20 MG/100ML; MG/100ML; MG/100ML; MG/100ML
100 INJECTION, SOLUTION INTRAVENOUS CONTINUOUS
Status: DISCONTINUED | OUTPATIENT
Start: 2025-07-02 | End: 2025-07-03

## 2025-07-02 RX ORDER — HYDROCHLOROTHIAZIDE 12.5 MG/1
12.5 CAPSULE ORAL DAILY
Status: DISCONTINUED | OUTPATIENT
Start: 2025-07-02 | End: 2025-07-03 | Stop reason: HOSPADM

## 2025-07-02 RX ORDER — POTASSIUM CHLORIDE 14.9 MG/ML
20 INJECTION INTRAVENOUS ONCE
Status: COMPLETED | OUTPATIENT
Start: 2025-07-02 | End: 2025-07-02

## 2025-07-02 RX ORDER — AMLODIPINE BESYLATE 5 MG/1
5 TABLET ORAL ONCE
Status: COMPLETED | OUTPATIENT
Start: 2025-07-02 | End: 2025-07-02

## 2025-07-02 RX ORDER — AMLODIPINE BESYLATE 10 MG/1
10 TABLET ORAL DAILY
Status: DISCONTINUED | OUTPATIENT
Start: 2025-07-03 | End: 2025-07-03 | Stop reason: HOSPADM

## 2025-07-02 RX ORDER — POTASSIUM CHLORIDE 750 MG/1
40 TABLET, FILM COATED, EXTENDED RELEASE ORAL 2 TIMES DAILY
Status: DISCONTINUED | OUTPATIENT
Start: 2025-07-02 | End: 2025-07-02

## 2025-07-02 RX ORDER — CALCIUM CARBONATE 200(500)MG
1000 TABLET,CHEWABLE ORAL 4 TIMES DAILY PRN
Status: DISCONTINUED | OUTPATIENT
Start: 2025-07-02 | End: 2025-07-03 | Stop reason: HOSPADM

## 2025-07-02 RX ADMIN — FAMOTIDINE 20 MG: 20 TABLET, FILM COATED ORAL at 09:10

## 2025-07-02 RX ADMIN — SODIUM CHLORIDE, SODIUM LACTATE, POTASSIUM CHLORIDE, AND CALCIUM CHLORIDE 100 ML/HR: .6; .31; .03; .02 INJECTION, SOLUTION INTRAVENOUS at 23:43

## 2025-07-02 RX ADMIN — SODIUM CHLORIDE, SODIUM LACTATE, POTASSIUM CHLORIDE, AND CALCIUM CHLORIDE 100 ML/HR: .6; .31; .03; .02 INJECTION, SOLUTION INTRAVENOUS at 12:30

## 2025-07-02 RX ADMIN — AMLODIPINE BESYLATE 5 MG: 5 TABLET ORAL at 11:06

## 2025-07-02 RX ADMIN — HYDROCHLOROTHIAZIDE 12.5 MG: 12.5 CAPSULE ORAL at 11:06

## 2025-07-02 RX ADMIN — AMLODIPINE BESYLATE 5 MG: 5 TABLET ORAL at 09:10

## 2025-07-02 RX ADMIN — ANTACID TABLETS 2 TABLET: 500 TABLET, CHEWABLE ORAL at 11:07

## 2025-07-02 RX ADMIN — NICOTINE 1 PATCH: 21 PATCH, EXTENDED RELEASE TRANSDERMAL at 09:10

## 2025-07-02 RX ADMIN — POTASSIUM CHLORIDE 40 MEQ: 750 TABLET, FILM COATED, EXTENDED RELEASE ORAL at 11:06

## 2025-07-02 RX ADMIN — CEFAZOLIN SODIUM 2 G: 2 SOLUTION INTRAVENOUS at 14:41

## 2025-07-02 RX ADMIN — ANTACID TABLETS 2 TABLET: 500 TABLET, CHEWABLE ORAL at 21:40

## 2025-07-02 RX ADMIN — CEFAZOLIN SODIUM 2 G: 2 SOLUTION INTRAVENOUS at 06:12

## 2025-07-02 RX ADMIN — FAMOTIDINE 20 MG: 20 TABLET, FILM COATED ORAL at 20:27

## 2025-07-02 RX ADMIN — CEFAZOLIN SODIUM 2 G: 2 SOLUTION INTRAVENOUS at 21:35

## 2025-07-02 RX ADMIN — POTASSIUM CHLORIDE 20 MEQ: 14.9 INJECTION, SOLUTION INTRAVENOUS at 11:07

## 2025-07-02 ASSESSMENT — PAIN SCALES - GENERAL
PAINLEVEL_OUTOF10: 0 - NO PAIN
PAINLEVEL_OUTOF10: 0 - NO PAIN

## 2025-07-02 ASSESSMENT — ACTIVITIES OF DAILY LIVING (ADL): LACK_OF_TRANSPORTATION: NO

## 2025-07-02 ASSESSMENT — PAIN - FUNCTIONAL ASSESSMENT: PAIN_FUNCTIONAL_ASSESSMENT: 0-10

## 2025-07-02 NOTE — CARE PLAN
The patient's goals for the shift include      The clinical goals for the shift include Patient safety throughout the shift.    Problem: Pain - Adult  Goal: Verbalizes/displays adequate comfort level or baseline comfort level  Outcome: Progressing     Problem: Safety - Adult  Goal: Free from fall injury  Outcome: Progressing     Problem: Discharge Planning  Goal: Discharge to home or other facility with appropriate resources  Outcome: Progressing     Problem: Chronic Conditions and Co-morbidities  Goal: Patient's chronic conditions and co-morbidity symptoms are monitored and maintained or improved  Outcome: Progressing

## 2025-07-02 NOTE — CONSULTS
"Nutrition Initial Assessment:   Nutrition Assessment    Reason for Assessment: Admission nursing screening    Medical history per chart:   Gopal Moreau is a 55 y.o. male with no significant PMHx presenting with wound on his right hand and his penis.  He stated that there was a wound to the side of his right third fingernails a couple weeks ago and later started developing lesion on his right second finger and left fifth finger and now he notes of a lesion to the lateral aspect of his penis. Patient notes that the lesions are painless and he started noticing the swelling of his right hand with redness tracking up to his arm today.  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     7/2: Pt alert and in chair during admission. Pt ordering lunch during visit. Pt reporting good appetite as normal and eating as normal. Appetite has decreased since admission due to discomfort from IV fluids. Pt reporting wt regularly fluctuates from 210-230# and wt loss was unintentional but normal for him. Pt declines ONS.    Nutrition History:  Energy Intake: Good > 75 %  Food and Nutrient History: Pt eating well and appetite good normally and decreased since admission. Pt eats 1 homecooked meal a day and snacks throughout day.       Average meal Intake during admission: %   Dietary Orders (From admission, onward)       Start     Ordered    07/01/25 1238  May Participate in Room Service  ( ROOM SERVICE MAY PARTICIPATE)  Once        Question:  .  Answer:  Yes    07/01/25 1237    06/30/25 2332  Adult diet Regular  Diet effective now        Question:  Diet type  Answer:  Regular    06/30/25 2331                    Anthropometrics:  Height: 182.9 cm (6' 0.01\")   Weight: 95.3 kg (210 lb 1.6 oz)   BMI (Calculated): 28.49  IBW/kg (Dietitian Calculated): 81 kg        Weight History:   Wt Readings from Last 10 Encounters:   07/02/25 95.3 kg (210 lb 1.6 oz)   12/26/24 105 " "kg (232 lb)   12/05/24 102 kg (224 lb)   10/31/24 102 kg (224 lb)   10/15/24 100 kg (221 lb 6.4 oz)       Weight Change %:  Weight History / % Weight Change: UBW between 210-230#. 12/26/24 232#, 06/30/25 210#. Pt reporting wt fluctuation is normal for him. 9.5% wt loss in 6 months.  Significant Weight Loss: No    Nutrition Focused Physical Exam Findings:  Subcutaneous Fat Loss:   Orbital Fat Pads: Well nourished (slightly bulging fat pads)  Buccal Fat Pads: Well nourished (full, rounded cheeks)  Triceps: Well nourished (ample fat tissue)  Muscle Wasting:  Temporalis: Well nourished (well-defined muscle)  Pectoralis (Clavicular Region): Well nourished (clavicle not visible)  Deltoid/Trapezius: Well nourished (rounded appearance at arm, shoulder, neck)  Interosseous: Well nourished (muscle bulges)  Edema:  Edema: none  Physical Findings:  Skin: Positive (wounds on hands, penis and coccyx per flowsheet)    Nutrition Significant Labs:    Results from last 7 days   Lab Units 07/02/25  1117 07/01/25  0436 06/30/25  1850   GLUCOSE mg/dL 110* 88 103*   SODIUM mmol/L 134* 139 133*   POTASSIUM mmol/L 3.8 3.0* 3.5   CHLORIDE mmol/L 104 113* 100   CO2 mmol/L 22 20* 23   BUN mg/dL 15 6 8   CREATININE mg/dL 1.80* 0.58 0.94   EGFR mL/min/1.73m*2 44* >90 >90   CALCIUM mg/dL 9.1 6.5* 9.5     No results found for: \"HGBA1C\"        Nutrition Specific Medications:  Meds reviewed/noted.   Scheduled Medications[1]   Continuous Medications[2]     I/O:    ; Stool Appearance: Watery (07/02/25 0811)    Estimated Needs:   Total Energy Estimated Needs in 24 hours (kCal):  (2380-2850kcal)  Method for Estimating Needs: 25-30kcal/kg  Total Protein Estimated Needs in 24 Hours (g):  (95-114g)  Method for Estimating 24 Hour Protein Needs: 1.0-1.2g/kg  Total Fluid Estimated Needs in 24 Hours (mL):  (2380-2850ml)  Method for Estimating 24 Hour Fluid Needs: 1ml/kcal        Nutrition Diagnosis   Malnutrition Diagnosis  Patient has Malnutrition Diagnosis: " No    Nutrition Diagnosis  Patient has Nutrition Diagnosis: Yes  Diagnosis Status (1): New  Nutrition Diagnosis 1: Increased nutrient needs  Related to (1): cellulitis  As Evidenced by (1): hand, penis, and coccyx wounds       Nutrition Interventions/Recommendations   Nutrition prescription for oral nutrition    Nutrition Recommendations:  Individualized Nutrition Prescription Provided for : Consume diet as ordered: Adult diet Regular.    Nutrition Interventions/Goals:   Interventions: Meals and snacks  Meals and Snacks: General healthful diet  Goal: Consume >75% meals      Education Documentation  N/A            Nutrition Monitoring and Evaluation   Food/Nutrient Related History Monitoring  Monitoring and Evaluation Plan: Estimated Energy Intake  Estimated Energy Intake: Energy intake greater or equal to 75% of estimated energy needs    Anthropometric Measurements  Monitoring and Evaluation Plan: Body weight  Body Weight: Body weight - Maintain stable weight    Biochemical Data, Medical Tests and Procedures  Monitoring and Evaluation Plan: Electrolyte/renal panel, Glucose/endocrine profile  Electrolyte and Renal Panel: Electrolytes within normal limits  Glucose/Endocrine Profile: Glucose within normal limits ( mg/dL)    Physical Exam Findings  Monitoring and Evaluation Plan: Skin  Skin Finding: Impaired wound healing - Improved wound healing    Goal Status: New goal(s) identified    Time Spent (min): 45 minutes              [1] [START ON 7/3/2025] amLODIPine, 10 mg, oral, Daily  ceFAZolin, 2 g, intravenous, q8h  famotidine, 20 mg, oral, BID  [Held by provider] hydroCHLOROthiazide, 12.5 mg, oral, Daily  nicotine, 1 patch, transdermal, Daily  potassium chloride, 20 mEq, intravenous, Once     [2] lactated Ringer's, 100 mL/hr, Last Rate: 100 mL/hr (07/02/25 1230)

## 2025-07-02 NOTE — PROGRESS NOTES
Gopal Moreau is a 55 y.o. male on day 1 of admission presenting with Cellulitis of upper extremity, unspecified laterality.      Assessment/Plan:     #Sepsis  #Cellulitis of bilateral upper extremities  Leukocytosis, HR 91. Swab culture positive for gram positive cocci in pairs/chains.  Likely due to beta-hemolytic strep skin and soft tissue infection with autoinoculation, likely complicated by lymphangitis. Repeat swab of left hand is pending. Vancomycin, zosyn discontinued and Cefazolin started.      #Single penile lesion  The penile lesion may represent a secondary autoinoculated site, though other causes must be ruled out. Syphilis, HIV negative.  Gonorrhea, chlamydia pending     #Coccygeal ulcer  Decubitus ulcer?.  Patient states that he is usually very active and does not spend time laying in the bed.  No signs of infection around the wound     Recommendations:     -Cont Cefazolin 2gm q8h  -Monitor blood cultures, wound cultures  -Pending gonorrhea, chlamydia  -Tentative plan to switch to Keflex 1 g 3 times daily tomorrow for 10 days of antibiotic  -Wound care consulted  - Will continue to follow        Onel Chen MD  Date of service: 7/2/2025  Time of service: 11:24 AM      Subjective   Interval History: No acute events overnight.  Patient denies any complaint.  Reports improvement in the redness and pain        Review of Systems  Denies: fever, chills, nausea, vomiting, diarrhea, dysuria    Objective   Range of Vitals (last 24 hours)  Heart Rate:  [53-84]   Temp:  [36 °C (96.8 °F)-37.3 °C (99.1 °F)]   Resp:  [12-19]   BP: (135-184)/(82-95)   SpO2:  [92 %-98 %]  Daily Weight  06/30/25 : 95.3 kg (210 lb)   Body mass index is 28.48 kg/m².    General: alert, oriented, NAD  HEENT: No conjunctival pallor, no scleral icterus, no oral ulcers  Abdomen: soft, non tender, non distended, BS+  Neuro: AAO x 3, PERRL, CN grossly intact     : Superficial ulcer along left side of the penile base. Pic in chart.      Skin: Multiple well-demarcated, shallow ulcerations with erythematous bases noted on the dorsal aspect of the right 3rd/4th digit, 2nd MCP, and left 5th digit. Right arm ascending lymphangitis present, Purulent fluid expressed on palpation with minimal tenderness from the L hand. New, small ulcer location within gluteal clef      Antibiotics  ceFAZolin - 2 gram/50 mL      Relevant Results  Labs  Results from last 72 hours   Lab Units 07/01/25  0436 06/30/25  1850   WBC AUTO x10*3/uL 10.4 14.0*   HEMOGLOBIN g/dL 13.1* 16.9   HEMATOCRIT % 37.1* 46.7   PLATELETS AUTO x10*3/uL 239 311   NEUTROS PCT AUTO %  --  79.2   LYMPHS PCT AUTO %  --  13.2   MONOS PCT AUTO %  --  6.3   EOS PCT AUTO %  --  0.4     Results from last 72 hours   Lab Units 07/01/25  0436 06/30/25  1850   SODIUM mmol/L 139 133*   POTASSIUM mmol/L 3.0* 3.5   CHLORIDE mmol/L 113* 100   CO2 mmol/L 20* 23   BUN mg/dL 6 8   CREATININE mg/dL 0.58 0.94   GLUCOSE mg/dL 88 103*   CALCIUM mg/dL 6.5* 9.5   ANION GAP mmol/L 9* 14   EGFR mL/min/1.73m*2 >90 >90     Results from last 72 hours   Lab Units 07/01/25  0436 06/30/25  1850   ALK PHOS U/L 56 87   BILIRUBIN TOTAL mg/dL 0.5 0.7   PROTEIN TOTAL g/dL 5.2* 8.3*   ALT U/L 10 15   AST U/L 14 18   ALBUMIN g/dL 3.0* 4.6     Estimated Creatinine Clearance: 125 mL/min (by C-G formula based on SCr of 0.58 mg/dL).  C-Reactive Protein   Date Value Ref Range Status   06/30/2025 4.84 (H) <1.00 mg/dL Final       Microbiology  No results found for the last 14 days.      Imaging    No results found.

## 2025-07-02 NOTE — CONSULTS
Unable to see patient at this time for wound consult, attempted earlier today and he was out of his room. Photos reviewed and recommendations below discussed with IMS, ID and bedside Rn. Will evaluate and assess further in morning.       Coccyx- Apply dime size amount of triad, cover with mepilex border. Change every other day/prn.     Paint lesions to hand with betadine, leave DESTINY.     Penile lesion- consider urology consult. May consider alginate dressing or triad. Cleanse with vashe, cover with aquacel ag cut to size and dry dressing daily/prn.     Please contact me with questions or changes in patient condition.   Rebecca Byrd. RN  Wound/Ostomy Care  [phone]

## 2025-07-02 NOTE — PROGRESS NOTES
Gopal Moreau is a 55 y.o. male on day 1 of admission presenting with Cellulitis of upper extremity, unspecified laterality.      Subjective      Gopal Moreau is a 55 y.o. male with no significant PMHx presenting with wound on his right hand and his penis.  He stated that there was a wound to the side of his right third fingernails a couple weeks ago and later started developing lesion on his right second finger and left fifth finger and now he notes of a lesion to the lateral aspect of his penis. Patient notes that the lesions are painless and he started noticing the swelling of his right hand with redness tracking up to his arm today.  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 He notes he has been putting on hydrogen peroxide and alcohol swabs without improvement. He denies any new sexual partners, history of STDs.  Denies animal bites, gardening.  Denies fever chills, nausea vomiting, chest pain, short of breath, penile discharge, numbness or motor limitation in his hands.  He is left-hand dominant.  Denies IV drug use.     ED Course:   Vitals on presentation: T 37.4 °C (99.3 °F)  HR 91  BP (!) 182/94  RR 16  O2 98 % None (Room air)  Labs:   CBC with WBC 14.0, Hgb 16.9, Plts 311.   CMP with glucose 103, Na 133, K 3.5, BUN 8, sCr 0.94, alk phos 87, ALT 15, AST 18, bilirubin 0.7.   CRP 4.84, sed rate 36  EKG: None  Imaging - agree with radiology interpretation(s): None  Interventions: Started on Zosyn, vancomycin, admission for further management  7/1: Patient was seen and examined.  Redness and swelling of the right hand is improving today.  Blood cultures are still pending.  Continue current IV antibiotics.  Seen by ID who has added gonorrhea, chlamydia and HIV screening.  Will continue to trend CBC and BMP daily.  7/2:Patient was seen and examined. Ulcer and cellulitis improving. Blood cultures are negative X 1.  HIV, chlamydia,  coronary and negative.  Creatinine bumped today and etiology unclear could be prerenal.  Started on IV fluids.  Will get renal ultrasound urine eosinophil urine sodium urine urea and urine urine creatinine.  Repeat BMP today at 1500.  .  Likely change antibiotics to oral if creatinine improves and cultures remain negative.  Discussed with ID about potential cefazolin associated AIN/ATN.  Vancomycin and Zosyn could also contribute to nephrotoxicity.  Currently off Vanco we will continue cefazolin for now. Continue to trend CBC and BMP daily Trends      Objective     Last Recorded Vitals  BP (!) 162/95 (BP Location: Right arm, Patient Position: Sitting) Comment: RN notified  Pulse 70   Temp 36.3 °C (97.4 °F) (Temporal)   Resp 18   Wt 95.3 kg (210 lb)   SpO2 96%   Intake/Output last 3 Shifts:    Intake/Output Summary (Last 24 hours) at 7/2/2025 1235  Last data filed at 7/2/2025 1113  Gross per 24 hour   Intake 740 ml   Output 0 ml   Net 740 ml       Admission Weight  Weight: 95.3 kg (210 lb) (06/30/25 1657)    Daily Weight  06/30/25 : 95.3 kg (210 lb)    Image Results  No image results found.      Physical Exam  Vitals:    07/02/25 0811   BP: (!) 162/95   Pulse: 70   Resp: 18   Temp: 36.3 °C (97.4 °F)   SpO2: 96%   Constitutional: Pleasant and cooperative. Laying in bed in no acute distress. Conversant.   Skin: Warm and dry; open wounds noted in medial aspect of right third distal phalanx, right second proximal phalanx and MTP joint and left fifth middle phalanx.  See media section for detail image.  Eyes: EOMI. Anicteric sclera.   ENT: Mucous membranes moist; no obvious injury or deformity appreciated.   Head and Neck: Normocephalic, atraumatic. ROM preserved. Trachea midline. No appreciable JVD.   Respiratory: Nonlabored on RA. Lungs clear to auscultation bilaterally without obvious adventitious sounds. Chest rise is equal.  Cardiovascular: RRR. No gross murmur, gallop, or rub. Extremities are warm and  well-perfused with good capillary refill (< 3 seconds). No chest wall tenderness.   GI: Abdomen soft, nontender, nondistended. No obvious organomegaly appreciated. Bowel sounds are present.  : Unable to exam, cannot find chaperone at the time  MSK: No gross abnormalities appreciated. No limitations to AROM/PROM appreciated.   Extremities: No cyanosis, edema, or clubbing evident. Neurovascularly intact.   Neuro: A&Ox3. CN 2-12 grossly intact. Able to respond to questions appropriately and clearly. No acute focal neurologic deficits appreciated.  Psych: Appropriate mood and behavior.    Relevant Results                              Assessment & Plan  Cellulitis of upper extremity, unspecified laterality      Cellulitis of upper extremity with lymphangitis  Painless ulcer in left lateral penis  -elevated CRP/ESR, leukocytosis  - Blood culture positive for gram-positive cocci in pairs  -IV Zosyn and vancomycin discontinued started on IV cefazolin  -Syphilis screen, blood cultures x 2, tissue/wound culture pending  -HIV gonorrhea chlamydia ordered  -Blood cultures ordered and pending  -ID on board and recommendations appreciated  -Does not meet sepsis criteria admission    LANI  Etiology likely prerenal  Started on IV fluid LR  Will get urine sodium urine urea and urine creatinine to calculate FeNa/Feurea  Renal ultrasound  Consider nephrology if creatinine continues to uptrend     Hypertensive urgency  -Started on oral amlodipine plus hydrochlorothiazide  -Currently not on home meds, BP poorly controlled  -hydralazine PRN with parameters ordered  -continue to monitor     Diet: Regular  DVT Prophylaxis: None indicated by guidelines  Code Status: Full Code   Additional Sources Reviewed: ED note day of admission     Anticipated Length of Stay (LOS): Patient will require one-to-two midnight stay for further evaluation and management, pending results of above and/or input from consultants.              Spent 35 minutes in  the follow-up management of this patient today    Eusebio Salcido MD

## 2025-07-02 NOTE — CARE PLAN
The patient's goals for the shift include  continue wound improvement    The clinical goals for the shift include Patient safety throughout the shift.    Over the shift, the patient did not make progress toward the following goals. Barriers to progression include . Recommendations to address these barriers include .     Home

## 2025-07-02 NOTE — PROGRESS NOTES
07/02/25 1047   Discharge Planning   Living Arrangements Alone   Support Systems Children   Assistance Needed Independent   Type of Residence Private residence   Home or Post Acute Services None   Expected Discharge Disposition Home   Does the patient need discharge transport arranged? No   Financial Resource Strain   How hard is it for you to pay for the very basics like food, housing, medical care, and heating? Not hard   Housing Stability   In the last 12 months, was there a time when you were not able to pay the mortgage or rent on time? N   At any time in the past 12 months, were you homeless or living in a shelter (including now)? N   Transportation Needs   In the past 12 months, has lack of transportation kept you from medical appointments or from getting medications? no   In the past 12 months, has lack of transportation kept you from meetings, work, or from getting things needed for daily living? No     .PCP is Anish Macias DO. Patient is from home alone. Patient is independent with ambulation, self care, driving, shopping, and meals.  Patient prefers to return home with no needs upon discharge. [facility] will continue to follow for needs if they arise.

## 2025-07-03 ENCOUNTER — PHARMACY VISIT (OUTPATIENT)
Dept: PHARMACY | Facility: CLINIC | Age: 55
End: 2025-07-03
Payer: COMMERCIAL

## 2025-07-03 VITALS
SYSTOLIC BLOOD PRESSURE: 143 MMHG | DIASTOLIC BLOOD PRESSURE: 83 MMHG | WEIGHT: 210.1 LBS | HEART RATE: 62 BPM | BODY MASS INDEX: 28.46 KG/M2 | HEIGHT: 72 IN | RESPIRATION RATE: 18 BRPM | OXYGEN SATURATION: 98 % | TEMPERATURE: 97.8 F

## 2025-07-03 DIAGNOSIS — N17.9 ACUTE KIDNEY INJURY: ICD-10-CM

## 2025-07-03 PROBLEM — L03.119 CELLULITIS OF UPPER EXTREMITY, UNSPECIFIED LATERALITY: Status: RESOLVED | Noted: 2025-06-30 | Resolved: 2025-07-03

## 2025-07-03 LAB
ALBUMIN SERPL BCP-MCNC: 3.7 G/DL (ref 3.4–5)
ALP SERPL-CCNC: 75 U/L (ref 33–120)
ALT SERPL W P-5'-P-CCNC: 8 U/L (ref 10–52)
ANION GAP SERPL CALC-SCNC: 13 MMOL/L (ref 10–20)
AST SERPL W P-5'-P-CCNC: 16 U/L (ref 9–39)
BACTERIA SPEC CULT: ABNORMAL
BACTERIA SPEC CULT: ABNORMAL
BASOPHILS # BLD AUTO: 0.04 X10*3/UL (ref 0–0.1)
BASOPHILS NFR BLD AUTO: 0.4 %
BILIRUB SERPL-MCNC: 0.5 MG/DL (ref 0–1.2)
BUN SERPL-MCNC: 15 MG/DL (ref 6–23)
CALCIUM SERPL-MCNC: 9.4 MG/DL (ref 8.6–10.3)
CHLORIDE SERPL-SCNC: 105 MMOL/L (ref 98–107)
CO2 SERPL-SCNC: 22 MMOL/L (ref 21–32)
CREAT SERPL-MCNC: 1.81 MG/DL (ref 0.5–1.3)
EGFRCR SERPLBLD CKD-EPI 2021: 44 ML/MIN/1.73M*2
EOSINOPHIL # BLD AUTO: 0.11 X10*3/UL (ref 0–0.7)
EOSINOPHIL NFR BLD AUTO: 1.1 %
ERYTHROCYTE [DISTWIDTH] IN BLOOD BY AUTOMATED COUNT: 12.6 % (ref 11.5–14.5)
GLUCOSE SERPL-MCNC: 108 MG/DL (ref 74–99)
GRAM STN SPEC: ABNORMAL
HCT VFR BLD AUTO: 44 % (ref 41–52)
HGB BLD-MCNC: 15.2 G/DL (ref 13.5–17.5)
HOLD SPECIMEN: NORMAL
IMM GRANULOCYTES # BLD AUTO: 0.05 X10*3/UL (ref 0–0.7)
IMM GRANULOCYTES NFR BLD AUTO: 0.5 % (ref 0–0.9)
LYMPHOCYTES # BLD AUTO: 2.09 X10*3/UL (ref 1.2–4.8)
LYMPHOCYTES NFR BLD AUTO: 20.3 %
MCH RBC QN AUTO: 29.9 PG (ref 26–34)
MCHC RBC AUTO-ENTMCNC: 34.5 G/DL (ref 32–36)
MCV RBC AUTO: 86 FL (ref 80–100)
MONOCYTES # BLD AUTO: 0.97 X10*3/UL (ref 0.1–1)
MONOCYTES NFR BLD AUTO: 9.4 %
NEUTROPHILS # BLD AUTO: 7.03 X10*3/UL (ref 1.2–7.7)
NEUTROPHILS NFR BLD AUTO: 68.3 %
NRBC BLD-RTO: 0 /100 WBCS (ref 0–0)
PLATELET # BLD AUTO: 281 X10*3/UL (ref 150–450)
POTASSIUM SERPL-SCNC: 3.8 MMOL/L (ref 3.5–5.3)
PROT SERPL-MCNC: 6.9 G/DL (ref 6.4–8.2)
RBC # BLD AUTO: 5.09 X10*6/UL (ref 4.5–5.9)
SODIUM SERPL-SCNC: 136 MMOL/L (ref 136–145)
WBC # BLD AUTO: 10.3 X10*3/UL (ref 4.4–11.3)

## 2025-07-03 PROCEDURE — 80053 COMPREHEN METABOLIC PANEL: CPT | Performed by: INTERNAL MEDICINE

## 2025-07-03 PROCEDURE — 85025 COMPLETE CBC W/AUTO DIFF WBC: CPT | Performed by: INTERNAL MEDICINE

## 2025-07-03 PROCEDURE — 2500000001 HC RX 250 WO HCPCS SELF ADMINISTERED DRUGS (ALT 637 FOR MEDICARE OP): Performed by: INTERNAL MEDICINE

## 2025-07-03 PROCEDURE — 2500000004 HC RX 250 GENERAL PHARMACY W/ HCPCS (ALT 636 FOR OP/ED): Performed by: INTERNAL MEDICINE

## 2025-07-03 PROCEDURE — RXMED WILLOW AMBULATORY MEDICATION CHARGE

## 2025-07-03 PROCEDURE — 99239 HOSP IP/OBS DSCHRG MGMT >30: CPT | Performed by: INTERNAL MEDICINE

## 2025-07-03 PROCEDURE — S4991 NICOTINE PATCH NONLEGEND: HCPCS | Performed by: INTERNAL MEDICINE

## 2025-07-03 PROCEDURE — 2500000002 HC RX 250 W HCPCS SELF ADMINISTERED DRUGS (ALT 637 FOR MEDICARE OP, ALT 636 FOR OP/ED): Performed by: INTERNAL MEDICINE

## 2025-07-03 PROCEDURE — 2500000004 HC RX 250 GENERAL PHARMACY W/ HCPCS (ALT 636 FOR OP/ED): Performed by: STUDENT IN AN ORGANIZED HEALTH CARE EDUCATION/TRAINING PROGRAM

## 2025-07-03 PROCEDURE — 36415 COLL VENOUS BLD VENIPUNCTURE: CPT | Performed by: INTERNAL MEDICINE

## 2025-07-03 PROCEDURE — 2500000001 HC RX 250 WO HCPCS SELF ADMINISTERED DRUGS (ALT 637 FOR MEDICARE OP): Performed by: STUDENT IN AN ORGANIZED HEALTH CARE EDUCATION/TRAINING PROGRAM

## 2025-07-03 RX ORDER — CLONIDINE HYDROCHLORIDE 0.1 MG/1
0.1 TABLET ORAL ONCE
Status: COMPLETED | OUTPATIENT
Start: 2025-07-03 | End: 2025-07-03

## 2025-07-03 RX ORDER — HYDRALAZINE HYDROCHLORIDE 50 MG/1
50 TABLET, FILM COATED ORAL 2 TIMES DAILY
Qty: 60 TABLET | Refills: 0 | Status: CANCELLED | OUTPATIENT
Start: 2025-07-03 | End: 2025-08-02

## 2025-07-03 RX ORDER — HYDRALAZINE HYDROCHLORIDE 100 MG/1
100 TABLET, FILM COATED ORAL 2 TIMES DAILY
Qty: 60 TABLET | Refills: 0 | Status: SHIPPED | OUTPATIENT
Start: 2025-07-03 | End: 2025-08-02

## 2025-07-03 RX ORDER — SODIUM CHLORIDE, SODIUM LACTATE, POTASSIUM CHLORIDE, CALCIUM CHLORIDE 600; 310; 30; 20 MG/100ML; MG/100ML; MG/100ML; MG/100ML
100 INJECTION, SOLUTION INTRAVENOUS CONTINUOUS
Status: DISCONTINUED | OUTPATIENT
Start: 2025-07-03 | End: 2025-07-03 | Stop reason: HOSPADM

## 2025-07-03 RX ORDER — CEPHALEXIN 500 MG/1
500 CAPSULE ORAL 3 TIMES DAILY
Qty: 30 CAPSULE | Refills: 0 | Status: CANCELLED | OUTPATIENT
Start: 2025-07-03 | End: 2025-07-13

## 2025-07-03 RX ORDER — LINEZOLID 2 MG/ML
600 INJECTION, SOLUTION INTRAVENOUS 2 TIMES DAILY
Status: DISCONTINUED | OUTPATIENT
Start: 2025-07-03 | End: 2025-07-03

## 2025-07-03 RX ORDER — HYDRALAZINE HYDROCHLORIDE 50 MG/1
50 TABLET, FILM COATED ORAL 3 TIMES DAILY
Status: DISCONTINUED | OUTPATIENT
Start: 2025-07-03 | End: 2025-07-03

## 2025-07-03 RX ORDER — LINEZOLID 600 MG/1
600 TABLET, FILM COATED ORAL EVERY 12 HOURS SCHEDULED
Qty: 13 TABLET | Refills: 0 | Status: SHIPPED | OUTPATIENT
Start: 2025-07-03 | End: 2025-07-10

## 2025-07-03 RX ORDER — LINEZOLID 600 MG/1
600 TABLET, FILM COATED ORAL EVERY 12 HOURS SCHEDULED
Status: DISCONTINUED | OUTPATIENT
Start: 2025-07-03 | End: 2025-07-03 | Stop reason: HOSPADM

## 2025-07-03 RX ORDER — HYDRALAZINE HYDROCHLORIDE 50 MG/1
100 TABLET, FILM COATED ORAL 3 TIMES DAILY
Status: DISCONTINUED | OUTPATIENT
Start: 2025-07-03 | End: 2025-07-03 | Stop reason: HOSPADM

## 2025-07-03 RX ORDER — LINEZOLID 600 MG/1
600 TABLET, FILM COATED ORAL 2 TIMES DAILY
Qty: 20 TABLET | Refills: 0 | Status: CANCELLED | OUTPATIENT
Start: 2025-07-03 | End: 2025-07-13

## 2025-07-03 RX ORDER — CLONIDINE HYDROCHLORIDE 0.1 MG/1
0.1 TABLET ORAL 2 TIMES DAILY
Qty: 60 TABLET | Refills: 0 | Status: SHIPPED | OUTPATIENT
Start: 2025-07-03 | End: 2025-08-02

## 2025-07-03 RX ORDER — AMLODIPINE BESYLATE 10 MG/1
10 TABLET ORAL DAILY
Qty: 30 TABLET | Refills: 0 | Status: SHIPPED | OUTPATIENT
Start: 2025-07-04 | End: 2025-08-03

## 2025-07-03 RX ADMIN — NICOTINE 1 PATCH: 21 PATCH, EXTENDED RELEASE TRANSDERMAL at 08:35

## 2025-07-03 RX ADMIN — CLONIDINE HYDROCHLORIDE 0.1 MG: 0.1 TABLET ORAL at 18:01

## 2025-07-03 RX ADMIN — CEFAZOLIN SODIUM 2 G: 2 SOLUTION INTRAVENOUS at 05:07

## 2025-07-03 RX ADMIN — AMLODIPINE BESYLATE 10 MG: 10 TABLET ORAL at 08:35

## 2025-07-03 RX ADMIN — CLONIDINE HYDROCHLORIDE 0.1 MG: 0.1 TABLET ORAL at 16:32

## 2025-07-03 RX ADMIN — SODIUM CHLORIDE, POTASSIUM CHLORIDE, SODIUM LACTATE AND CALCIUM CHLORIDE 100 ML/HR: 600; 310; 30; 20 INJECTION, SOLUTION INTRAVENOUS at 12:37

## 2025-07-03 RX ADMIN — LINEZOLID 600 MG: 600 TABLET, FILM COATED ORAL at 14:27

## 2025-07-03 RX ADMIN — FAMOTIDINE 20 MG: 20 TABLET, FILM COATED ORAL at 08:35

## 2025-07-03 RX ADMIN — HYDRALAZINE HYDROCHLORIDE 100 MG: 50 TABLET ORAL at 14:58

## 2025-07-03 ASSESSMENT — COGNITIVE AND FUNCTIONAL STATUS - GENERAL
MOBILITY SCORE: 24
DAILY ACTIVITIY SCORE: 24

## 2025-07-03 ASSESSMENT — PAIN SCALES - GENERAL
PAINLEVEL_OUTOF10: 0 - NO PAIN
PAINLEVEL_OUTOF10: 0 - NO PAIN

## 2025-07-03 ASSESSMENT — PAIN - FUNCTIONAL ASSESSMENT
PAIN_FUNCTIONAL_ASSESSMENT: 0-10
PAIN_FUNCTIONAL_ASSESSMENT: 0-10

## 2025-07-03 NOTE — CONSULTS
Wound Care Consult     Visit Date: 7/3/2025      Patient Name: Gopal Moreau         MRN: [id number]             Assessment:  Wound 07/01/25 Infection Penis (Active)   Date First Assessed/Time First Assessed: 07/01/25 1610   Present on Original Admission: Yes  Primary Wound Type: Infection  Location: Penis  Wound Description (Comments): yellow slough looking base, ulcer appearing      Assessments 7/3/2025 10:54 AM   Wound Image     Present on Admission to Healthcare Facility Yes   Site Assessment Tan;Yellow;Sloughing;Red;Painful   Wound Length (cm) 2.5 cm   Wound Width (cm) 1 cm   Wound Surface Area (cm^2) 1.96 cm^2   Wound Depth (cm) 0.2 cm   Wound Volume (cm^3) 0.262 cm^3   Wound Healing % -10   Treatments Site care;Cleansed   Drainage Description Serosanguineous;Tan   Drainage Amount Small   Dressing Other (Comment)   Dressing Changed New       Active Orders   Date Order Priority Status Authorizing Provider   07/03/25 1125 Wound Care 3 Wounds Associated Routine Active Eusebio Salcido MD     - Wound Complexity:    Simple     - Cleanse with:    Wound Cleanser     - Apply::    Betadine Swab   07/03/25 1125 Wound Care Infection Penis Routine Active Eusebio Salcido MD     - Wound Complexity:    Simple     - Cleanse with:    Wound Cleanser       Inactive Orders   Date Order Priority Status Authorizing Provider   07/02/25 1528 Wound Care 3 Wounds Associated Routine Discontinued Eusebio Salcido MD     - Wound Complexity:    Simple     - Cleanse with:    Wound Cleanser     - Apply::    Betadine Swab   07/02/25 1528 Wound Care Infection Penis Routine Discontinued Eusebio Salcido MD     - Wound Complexity:    Simple     - Cleanse with:    Wound Cleanser   07/01/25 1842 Wound Care 3 Wounds Associated Routine Discontinued Eusebio Salcido MD     - Wound Complexity:    Simple     - Cleanse with:    Wound Cleanser       Wound 07/01/25 Infection Hand Posterior;Right (Active)   Date First  Assessed/Time First Assessed: 07/01/25 1611   Present on Original Admission: Yes  Primary Wound Type: Infection  Location: Hand  Wound Location Orientation: Posterior;Right  Wound Description (Comments): lesions      Assessments 7/3/2025 10:40 AM   Wound Image     Site Assessment Red;Brown;Dry   Shape multiple lesions noted to 2nd and 3rd fingers   Wound Length (cm) 11 cm   Wound Width (cm) 4 cm   Wound Surface Area (cm^2) 34.56 cm^2   Treatments Cleansed   Drainage Description None   Drainage Amount None   Dressing Other (Comment)   Dressing Changed New       Active Orders   Date Order Priority Status Authorizing Provider   07/03/25 1125 Wound Care 3 Wounds Associated Routine Active Hetalventpanchito Salcido MD     - Wound Complexity:    Simple     - Cleanse with:    Wound Cleanser     - Apply::    Betadine Swab       Inactive Orders   Date Order Priority Status Authorizing Provider   07/02/25 1528 Wound Care 3 Wounds Associated Routine Discontinued Eusebio Salcido MD     - Wound Complexity:    Simple     - Cleanse with:    Wound Cleanser     - Apply::    Betadine Swab   07/01/25 1842 Wound Care 3 Wounds Associated Routine Discontinued Hetalventpanchito Salcido MD     - Wound Complexity:    Simple     - Cleanse with:    Wound Cleanser       Wound 07/01/25 Infection Hand Left;Posterior (Active)   Date First Assessed/Time First Assessed: 07/01/25 1612   Present on Original Admission: Yes  Primary Wound Type: Infection  Location: Hand  Wound Location Orientation: Left;Posterior  Wound Description (Comments): lesions      Assessments 7/3/2025 10:41 AM   Wound Image     Present on Admission to Healthcare Facility Yes   Site Assessment Dry;Brown;Edema;Fragile;Pink   Wound Length (cm) 1 cm   Wound Width (cm) 1.5 cm   Wound Surface Area (cm^2) 1.18 cm^2   Treatments Cleansed   Drainage Description Purulent   Drainage Amount Small   Dressing Other (Comment)   Dressing Changed New       Active Orders   Date Order  Priority Status Authorizing Provider   25 1125 Wound Care 3 Wounds Associated Routine Active Eusebio Salcido MD     - Wound Complexity:    Simple     - Cleanse with:    Wound Cleanser     - Apply::    Betadine Swab       Inactive Orders   Date Order Priority Status Authorizing Provider   25 1528 Wound Care 3 Wounds Associated Routine Discontinued Eusebio Salcido MD     - Wound Complexity:    Simple     - Cleanse with:    Wound Cleanser     - Apply::    Betadine Swab   25 1842 Wound Care 3 Wounds Associated Routine Discontinued Eusebio Salcido MD     - Wound Complexity:    Simple     - Cleanse with:    Wound Cleanser       Wound 25 Irritant Contact Perspiration Coccyx (Active)   Date First Assessed/Time First Assessed: 25 1613   Present on Original Admission: Yes  Primary Wound Type: Irritant Contact  Secondary Wound Type - Irritant Contact Dermatitis: (c) Perspiration  Location: Coccyx      Assessments 7/3/2025 10:41 AM   Wound Image     Site Assessment Tan;Sloughing;Pink   Shape moist mirrored area to  cleft   Wound Length (cm) 2 cm   Wound Width (cm) 0.2 cm   Wound Surface Area (cm^2) 0.31 cm^2   Treatments Cleansed;Zinc - oxide paste   Drainage Description None   Drainage Amount None   Dressing Moisture barrier   Dressing Changed New       Active Orders   Date Order Priority Status Authorizing Provider   25 1125 Wound Care Irritant Contact Perspiration (VS infectious lesion) Coccyx Routine Active Eusebio Salcido MD     - Wound Complexity:    Simple     - Cleanse with:    Soap and Water     - Cover with::    Foam (Mepilex Border)       Inactive Orders   Date Order Priority Status Authorizing Provider   25 1528 Wound Care Other (Comments) Coccyx Routine Discontinued Eusebio Salcido MD     - Wound Complexity:    Simple     - Cleanse with:    Soap and Water     - Cover with::    Foam (Mepilex Border)   25 1842 Wound Care  Other (Comments) Coccyx Routine Discontinued Eusebio Salcido MD     - Wound Complexity:    Simple     - Cleanse with:    Wound Cleanser     - Cover with::    Foam (Mepilex Border)     Patient seen for multiple wound/lesions (present on admission). Patient states he has had wound for a few weeks despite attempts to clean/treat he reports no improvement. 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 Education provided regarding hand hygiene and wearing gloves of longer length when washing dishes. Patient states he plans on purchasing a pair. Wound cultures positive for strep, ID on consult. Dr. Chen arrived at bedside, expressed small amount of purulent drainage from left 5th digit lesion. Lesions to right hand dry/scabbed, appear imoroved from admission photo. Wound to coccyx, lashaun cleft area moist/excoriated, mirrored with medial sloughing noted, suspect wound as moisture associated skin breakdown, patient states he is very active, stands the majority of the day and sweats a lot especially with recent heat wave. Penile lesion noted also. Per ID note “The penile lesion may represent a secondary autoinoculated site, though other causes must be ruled out. Syphilis, HIV negative.  Gonorrhea, chlamydia pending”. Skin hygiene and dressing care preformed. Education provided to patient as well as home going supplies. Discussed wound center follow up with patient however at this time he refused as he states he is unaware if he will be able to take the time to go there, number provided to patient on dishcrage. See detailed assessment above from flowsheet. Recommendations below, reviewed with Dr. Pope.     Wound Treatment protocols recommended:  Coccyx/lashaun cleft area- Cleanse with soap and water, dry area. Apply dime size amount of triad to area twice daily and as needed, leave open to air.    Hand wounds- Cleanse with vashe. Then paint lesions to hand  with betadine twice daily, leave DESTINY unless open/draining or working then cover with clean dry dressing.    Penile lesion-Cleanse with vashe (do not wipe dry), cover with aquacel ag cut to size and dry dressing daily/prn.   Consider Wound Care Followup outpatient [facility] [phone]    Therapeutic surface: Patient sitting up in chair during exam. Patient independently amulates around room. Chiki 21.     Nursing updated, continue pressure injury preventions, wound care to be completed by nursing per orders and re-consult wound RN if needed as wound care is not following.    Please contact me with questions or changes in patient condition.   Rebecca Byrd. RN  Wound/Ostomy Care  [phone]

## 2025-07-03 NOTE — DISCHARGE INSTRUCTIONS
Wound Treatment protocols recommended:  Coccyx/lashaun cleft area- Cleanse with soap and water, dry area. Apply dime size amount of triad to area twice daily and as needed, leave open to air.    Hand wounds- Cleanse with vashe. Then paint lesions to hand with betadine twice daily, leave DESTINY unless open/draining or working then cover with clean dry dressing.    Penile lesion-Cleanse with vashe (do not wipe dry), cover with aquacel ag cut to size and dry dressing daily/prn.   Consider Wound Care Followup outpatient [facility] [phone]

## 2025-07-03 NOTE — PROGRESS NOTES
"Gopal Moreau is a 55 y.o. male on day 2 of admission presenting with Cellulitis of upper extremity, unspecified laterality.      Assessment/Plan:        Recommendations:    Estimated Creatinine Clearance: 56.5 mL/min (A) (by C-G formula based on SCr of 1.81 mg/dL (H)).        Onel Chen MD  Date of service: 7/3/2025  Time of service: 11:21 AM      Subjective   Interval History: ***        Review of Systems  Denies: fever, chills, nausea, vomiting, diarrhea, dysuria    Objective   Range of Vitals (last 24 hours)  Heart Rate:  [59-69]   Temp:  [36.1 °C (97 °F)-36.8 °C (98.3 °F)]   Resp:  [16-18]   BP: (154-185)/()   Height:  [182.9 cm (6' 0.01\")]   Weight:  [95.3 kg (210 lb 1.6 oz)]   SpO2:  [95 %-98 %]  Daily Weight  07/02/25 : 95.3 kg (210 lb 1.6 oz)   Body mass index is 28.49 kg/m².    ***  General: alert, oriented, NAD  HEENT: No conjunctival pallor, no scleral icterus  Neck: No LAD, No JVD  Lungs: bilaterally clear to auscultation, no wheezing  Heart: regular rate and rhythm, no murmur  Abdomen: soft, non tender, non distended, BS+  Neuro: AAO x 3, PERRL, CN grossly intact  Extremities: no edema, no cyanosis  Skin: No rashes or ulcers  MSK: No joint inflammation         Antibiotics  ceFAZolin - 2 gram/50 mL      Relevant Results  Labs  Results from last 72 hours   Lab Units 07/03/25  0436 07/01/25  0436 06/30/25  1850   WBC AUTO x10*3/uL 10.3 10.4 14.0*   HEMOGLOBIN g/dL 15.2 13.1* 16.9   HEMATOCRIT % 44.0 37.1* 46.7   PLATELETS AUTO x10*3/uL 281 239 311   NEUTROS PCT AUTO % 68.3  --  79.2   LYMPHS PCT AUTO % 20.3  --  13.2   MONOS PCT AUTO % 9.4  --  6.3   EOS PCT AUTO % 1.1  --  0.4     Results from last 72 hours   Lab Units 07/03/25  0436 07/02/25  1521 07/02/25  1117   SODIUM mmol/L 136 134* 134*   POTASSIUM mmol/L 3.8 4.0 3.8   CHLORIDE mmol/L 105 104 104   CO2 mmol/L 22 20* 22   BUN mg/dL 15 14 15   CREATININE mg/dL 1.81* 1.78* 1.80*   GLUCOSE mg/dL 108* 119* 110*   CALCIUM mg/dL 9.4 9.6 9.1 "   ANION GAP mmol/L 13 14 12   EGFR mL/min/1.73m*2 44* 45* 44*     Results from last 72 hours   Lab Units 07/03/25  0436 07/02/25  1117 07/01/25  0436   ALK PHOS U/L 75 81 56   BILIRUBIN TOTAL mg/dL 0.5 0.5 0.5   PROTEIN TOTAL g/dL 6.9 7.5 5.2*   ALT U/L 8* 11 10   AST U/L 16 20 14   ALBUMIN g/dL 3.7 3.9 3.0*     Estimated Creatinine Clearance: 56.5 mL/min (A) (by C-G formula based on SCr of 1.81 mg/dL (H)).  C-Reactive Protein   Date Value Ref Range Status   06/30/2025 4.84 (H) <1.00 mg/dL Final       Microbiology  Susceptibility data from last 14 days.  Collected Specimen Info Organism   07/01/25 Tissue/Biopsy from Skin/Superficial Abscess Streptococcus pyogenes (Group A Streptococcus)   06/30/25 Tissue/Biopsy from Wound/Tissue Streptococcus pyogenes (Group A Streptococcus)       Imaging    US renal complete  Result Date: 7/2/2025  Normal appearance of the kidneys sonographically. No hydronephrosis.   MACRO: None   Signed by: Yamile Antonio 7/2/2025 3:28 PM Dictation workstation:   [id number]

## 2025-07-03 NOTE — CONSULTS
Nephrology Consult Note                                                                                                                                         Inpatient consult to Nephrology  Consult performed by: Sunni Brink DO  Consult ordered by: Eusebio Salcido MD                                                                                                               HPI  Patient is a 55 y.o. male history of likely untreated hypertension presented on 6/30/2025 with a wound on his right hand and its penis that were present for approximately 2 weeks.  He started develop erythema up his right arm which caused him to go to the ER.  Patient was seen by infectious disease on was initially started on Zosyn, vancomycin.  He was then switched to cefazolin.  He had improvement of his beta-hemolytic strep infection that was complicated by lymphangitis.  Nephrology consulted in view of LANI.   Patient's initial creatinine was 0.  9 on day of admission went down to 0.6 then  in 24 hours increased to 1.8.  Creatinine has remained stable at 1.8 the last 24 hours.  Patient has been switched to Zyvox.  Patient is eager to be discharged.  Denies any recent NSAID use.  He is a half a pack per day smoker.  He works as a .  He did note that he has been very stressed financially after lipoma removal procedure done in December.  He admits that he is not going to physicians because of this.  I reviewed his blood pressure readings at the time of surgery and in the office visit and it was high 148/100.  Inpatient he received hydrochlorothiazide but this was stopped due to LANI.  He was then started on amlodipine 10 mg daily.  Blood pressure still remains high.  Patient denies any difficulty with urination.  He noted that his urine became more yellow as he got antibiotics.  It was  yellow to begin with and never brown or cloudy.    Risk factors for LANI  Hypotension no  Contrast no  At risk medications no    Medical History[1]   Social History     Socioeconomic History    Marital status: Single     Spouse name: Not on file    Number of children: Not on file    Years of education: Not on file    Highest education level: Not on file   Occupational History    Not on file   Tobacco Use    Smoking status: Some Days     Current packs/day: 0.25     Average packs/day: 0.3 packs/day for 49.2 years (12.3 ttl pk-yrs)     Types: Cigarettes    Smokeless tobacco: Never   Vaping Use    Vaping status: Every Day    Substances: Nicotine   Substance and Sexual Activity    Alcohol use: Never    Drug use: Not Currently     Types: Marijuana    Sexual activity: Yes     Partners: Female     Birth control/protection: Post-menopausal   Other Topics Concern    Not on file   Social History Narrative    Not on file     Social Drivers of Health     Financial Resource Strain: Low Risk  (7/2/2025)    Overall Financial Resource Strain (CARDIA)     Difficulty of Paying Living Expenses: Not hard at all   Recent Concern: Financial Resource Strain - Medium Risk (7/1/2025)    Overall Financial Resource Strain (CARDIA)     Difficulty of Paying Living Expenses: Somewhat hard   Food Insecurity: Food Insecurity Present (7/1/2025)    Hunger Vital Sign     Worried About Running Out of Food in the Last Year: Sometimes true     Ran Out of Food in the Last Year: Sometimes true   Transportation Needs: No Transportation Needs (7/2/2025)    PRAPARE - Transportation     Lack of Transportation (Medical): No     Lack of Transportation (Non-Medical): No   Physical Activity: Not on file   Stress: Not on file   Social Connections: Not on file   Intimate Partner Violence: Not At Risk (7/1/2025)    Humiliation, Afraid, Rape, and Kick questionnaire     Fear of Current or Ex-Partner: No     Emotionally Abused: No     Physically Abused: No     Sexually  "Abused: No   Housing Stability: Low Risk  (7/2/2025)    Housing Stability Vital Sign     Unable to Pay for Housing in the Last Year: No     Number of Times Moved in the Last Year: 0     Homeless in the Last Year: No      Family History[2]   Medications Ordered Prior to Encounter[3]   Scheduled medications  Scheduled Medications[4]  Continuous medications  Continuous Medications[5]  PRN medications  PRN Medications[6]     Review of systems  as per HPI otherwise 10 point review systems negative    BP (!) 178/102 (BP Location: Left arm, Patient Position: Sitting)   Pulse 74   Temp 36.4 °C (97.6 °F) (Temporal)   Resp 17   Ht 1.829 m (6' 0.01\")   Wt 95.3 kg (210 lb 1.6 oz)   SpO2 98%   BMI 28.49 kg/m²     Input / Output:  24 HR:   Intake/Output Summary (Last 24 hours) at 7/3/2025 1503  Last data filed at 7/3/2025 1445  Gross per 24 hour   Intake 2918.34 ml   Output --   Net 2918.34 ml       Physical Exam   Alert and oriented x 3, NAD  EOMI  OP clear  Neck: supple, No JVD  CV: RRR without m/r/g  Lungs: CTA bilaterally  Abd: soft NT/ND +BS  Ext: no lower extremity edema   Neuro: grossly intact  Skin: Right hand wound scabbed over    Results from last 7 days   Lab Units 07/03/25  0436 07/02/25  1521 07/02/25  1117   SODIUM mmol/L 136 134* 134*   POTASSIUM mmol/L 3.8 4.0 3.8   CHLORIDE mmol/L 105 104 104   CO2 mmol/L 22 20* 22   BUN mg/dL 15 14 15   CREATININE mg/dL 1.81* 1.78* 1.80*   GLUCOSE mg/dL 108* 119* 110*   CALCIUM mg/dL 9.4 9.6 9.1        Results from last 7 days   Lab Units 07/03/25  0436   SODIUM mmol/L 136   POTASSIUM mmol/L 3.8   CHLORIDE mmol/L 105   CO2 mmol/L 22   BUN mg/dL 15   CREATININE mg/dL 1.81*   CALCIUM mg/dL 9.4   PROTEIN TOTAL g/dL 6.9   BILIRUBIN TOTAL mg/dL 0.5   ALK PHOS U/L 75   ALT U/L 8*   AST U/L 16   GLUCOSE mg/dL 108*           Results from last 7 days   Lab Units 07/03/25  0436 07/01/25  0436 06/30/25  1850   WBC AUTO x10*3/uL 10.3 10.4 14.0*   HEMOGLOBIN g/dL 15.2 13.1* 16.9 "   HEMATOCRIT % 44.0 37.1* 46.7   PLATELETS AUTO x10*3/uL 281 239 311        No orders to display        Assessment:   Patient is 55 y.o. male history of untreated hypertension who is admitted to hospital for group A beta hemolytic strep and wound infection/cellulitis/lymphangitis. Nephrology consulted in view of LANI.    LANI  - Baseline creatinine 0.6-0.9  - Admit creatinine 0.9, increased to 1.8  - Patient was being treated for his infection with Zosyn and vancomycin but not very long on these 2 drug, before switching to cefazolin  - Fena was greater than 1% suggesting intrinsic injury  Renal ultrasound was normal with no hydronephrosis  -Urinalysis was benign  - Lower possibility of acute interstitial nephritis or LANI due to Vanco and Zosyn combination as the duration of antibiotics has been short  - Likely has septic ATN which should resolve  - Creatinine is stable    Hypertension  - Has not been treated as outpatient  - Started on amlodipine 10 mg daily here    Recommendations:   - I would be okay for discharge from renal view as his kidney function is stable  - I gave patient my office information and he can follow-up in the office.  BMP in 1 week, will help him arrange the follow-up  - As far as the hypertension, discussed with primary service and planning to add hydralazine now.  If BP is improved he will likely be discharged today.  - Hypertension management can be continued as outpatient and I did stress the importance of this to the patient.      Please message me through EPIC chat with any questions or concerns.     Sunni Brink DO  7/3/2025  3:03 PM         [facility]    [address], [address]   [place] [address]  Office: [phone]                   [1] No past medical history on file.  [2]   Family History  Problem Relation Name Age of Onset    Lung cancer Mother     [3]   No current facility-administered medications on file prior to encounter.     Current Outpatient  Medications on File Prior to Encounter   Medication Sig Dispense Refill    aspirin-acetaminophen-caffeine (Excedrin Migraine) 250-250-65 mg tablet Take 1 tablet by mouth every 6 hours if needed for headaches or mild pain (1 - 3).      multivitamin tablet Take 1 tablet by mouth once daily.      turmeric 400 mg capsule Take 1 capsule by mouth once daily.     [4] amLODIPine, 10 mg, oral, Daily  famotidine, 20 mg, oral, BID  hydrALAZINE, 100 mg, oral, TID  [Held by provider] hydroCHLOROthiazide, 12.5 mg, oral, Daily  linezolid, 600 mg, oral, q12h ALTA  nicotine, 1 patch, transdermal, Daily  [5] lactated Ringer's, 100 mL/hr, Last Rate: 100 mL/hr (07/03/25 1237)  [6] PRN medications: acetaminophen, benzocaine-menthol, calcium carbonate, dextromethorphan-guaifenesin, melatonin, ondansetron, polyethylene glycol

## 2025-07-03 NOTE — PROGRESS NOTES
Gopal Moreau is a 55 y.o. male on day 2 of admission presenting with Cellulitis of upper extremity, unspecified laterality.      Subjective      Gopal Moreau is a 55 y.o. male with no significant PMHx presenting with wound on his right hand and his penis.  He stated that there was a wound to the side of his right third fingernails a couple weeks ago and later started developing lesion on his right second finger and left fifth finger and now he notes of a lesion to the lateral aspect of his penis. Patient notes that the lesions are painless and he started noticing the swelling of his right hand with redness tracking up to his arm today.  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 He notes he has been putting on hydrogen peroxide and alcohol swabs without improvement. He denies any new sexual partners, history of STDs.  Denies animal bites, gardening.  Denies fever chills, nausea vomiting, chest pain, short of breath, penile discharge, numbness or motor limitation in his hands.  He is left-hand dominant.  Denies IV drug use.     ED Course:   Vitals on presentation: T 37.4 °C (99.3 °F)  HR 91  BP (!) 182/94  RR 16  O2 98 % None (Room air)  Labs:   CBC with WBC 14.0, Hgb 16.9, Plts 311.   CMP with glucose 103, Na 133, K 3.5, BUN 8, sCr 0.94, alk phos 87, ALT 15, AST 18, bilirubin 0.7.   CRP 4.84, sed rate 36  EKG: None  Imaging - agree with radiology interpretation(s): None  Interventions: Started on Zosyn, vancomycin, admission for further management  7/1: Patient was seen and examined.  Redness and swelling of the right hand is improving today.  Blood cultures are still pending.  Continue current IV antibiotics.  Seen by ID who has added gonorrhea, chlamydia and HIV screening.  Will continue to trend CBC and BMP daily.  7/2:Patient was seen and examined. Ulcer and cellulitis improving. Blood cultures are negative X 1.  HIV, chlamydia,  coronary and negative.  Creatinine bumped today and etiology unclear could be prerenal.  Started on IV fluids.  Will get renal ultrasound urine eosinophil urine sodium urine urea and urine urine creatinine.  Repeat BMP today at 1500.  .  Likely change antibiotics to oral if creatinine improves and cultures remain negative.  Discussed with ID about potential cefazolin associated AIN/ATN.  Vancomycin and Zosyn could also contribute to nephrotoxicity.  Currently off Vanco we will continue cefazolin for now. Continue to trend CBC and BMP daily  7/3: Patient was seen and examined.  Blood cultures are negative times today.  Patient feels clinically better.  Creatinine however remains elevated.FeNa calculation suggestive of intrinsic renal disease.  Renal ultrasound shows normal appearance of the kidneys sonographically.  Resumed IV fluid LR.  Urine eosinophils is negative.  Nephrologist consulted.  Will continue to trend CBC and BMP daily.      Objective     Last Recorded Vitals  BP (!) 174/91 (BP Location: Left arm, Patient Position: Sitting)   Pulse 69   Temp 36.3 °C (97.4 °F) (Temporal)   Resp 18   Wt 95.3 kg (210 lb 1.6 oz)   SpO2 97%   Intake/Output last 3 Shifts:    Intake/Output Summary (Last 24 hours) at 7/3/2025 1246  Last data filed at 7/3/2025 1132  Gross per 24 hour   Intake 2518.34 ml   Output 0 ml   Net 2518.34 ml       Admission Weight  Weight: 95.3 kg (210 lb) (06/30/25 1657)    Daily Weight  07/02/25 : 95.3 kg (210 lb 1.6 oz)    Image Results  US renal complete  Narrative: Interpreted By:  Yamile Antonio,   STUDY:  US RENAL COMPLETE;  7/2/2025 2:23 pm      INDICATION:  Signs/Symptoms:LANI on CKD.          COMPARISON:  None.      ACCESSION NUMBER(S):  [id number]      ORDERING CLINICIAN:  BARAK HARRINGTON      TECHNIQUE:  Multiple images of the kidneys were obtained  .      FINDINGS:  RIGHT KIDNEY:  The right kidney measures 13.7 cm in length. The renal cortical  echogenicity and thickness are  within normal limits. No  hydronephrosis is present; no evidence of nephrolithiasis.      LEFT KIDNEY:  The left kidney measures 13.5 cm in length. The renal cortical  echogenicity and thickness are within normal limits. No  hydronephrosis is present; no evidence of nephrolithiasis.      BLADDER:  Urinary bladder is physiologically full. There is no wall thickening.  No intraluminal echogenic foci are noted.      Impression: Normal appearance of the kidneys sonographically. No hydronephrosis.      MACRO:  None      Signed by: Yamile Antonio 7/2/2025 3:28 PM  Dictation workstation:   [id number]      Physical Exam  Vitals:    07/03/25 0806   BP: (!) 174/91   Pulse: 69   Resp: 18   Temp: 36.3 °C (97.4 °F)   SpO2: 97%   Constitutional: Pleasant and cooperative. Laying in bed in no acute distress. Conversant.   Skin: Warm and dry; open wounds noted in medial aspect of right third distal phalanx, right second proximal phalanx and MTP joint and left fifth middle phalanx.  See media section for detail image.  Eyes: EOMI. Anicteric sclera.   ENT: Mucous membranes moist; no obvious injury or deformity appreciated.   Head and Neck: Normocephalic, atraumatic. ROM preserved. Trachea midline. No appreciable JVD.   Respiratory: Nonlabored on RA. Lungs clear to auscultation bilaterally without obvious adventitious sounds. Chest rise is equal.  Cardiovascular: RRR. No gross murmur, gallop, or rub. Extremities are warm and well-perfused with good capillary refill (< 3 seconds). No chest wall tenderness.   GI: Abdomen soft, nontender, nondistended. No obvious organomegaly appreciated. Bowel sounds are present.  : Unable to exam, cannot find chaperone at the time  MSK: No gross abnormalities appreciated. No limitations to AROM/PROM appreciated.   Extremities: No cyanosis, edema, or clubbing evident. Neurovascularly intact.   Neuro: A&Ox3. CN 2-12 grossly intact. Able to respond to questions appropriately and clearly. No acute  focal neurologic deficits appreciated.  Psych: Appropriate mood and behavior.    Relevant Results                              Assessment & Plan  Cellulitis of upper extremity, unspecified laterality      Cellulitis of upper extremity with lymphangitis  Painless ulcer in left lateral penis  -elevated CRP/ESR, leukocytosis  - Blood culture positive for gram-positive cocci in pairs  -IV Zosyn and vancomycin discontinued started on IV cefazolin  -Syphilis screen, blood cultures x 2, tissue/wound culture pending  -HIV gonorrhea chlamydia ordered  -Blood cultures ordered and pending  -ID on board and recommendations appreciated  -Does not meet sepsis criteria admission    LANI  Etiology likely prerenal  Continue on IV fluid LR  FeNa/Feurea suggestive of intrinsic renal disease  Renal ultrasound reviewed showing no significant findings  Nephrologist consulted     Hypertensive urgency  -Started on oral amlodipine plus hydrochlorothiazide  -Currently not on home meds, BP poorly controlled  -hydralazine PRN with parameters ordered  -continue to monitor     Diet: Regular  DVT Prophylaxis: None indicated by guidelines  Code Status: Full Code   Additional Sources Reviewed: ED note day of admission     Anticipated Length of Stay (LOS): Patient will require one-to-two midnight stay for further evaluation and management, pending results of above and/or input from consultants.              Spent 35 minutes in the follow-up management of this patient today    Eusebio Salcido MD

## 2025-07-03 NOTE — PROGRESS NOTES
Gopal Moreau is a 55 y.o. male on day 2 of admission presenting with Cellulitis of upper extremity, unspecified laterality.      Assessment/Plan:     #Sepsis  #Cellulitis of bilateral upper extremities  Leukocytosis, HR 91.  Cultures positive for group A strep and soft tissue infection with autoinoculation, likely complicated by lymphangitis. Repeat swab of left hand is pending. Vancomycin, zosyn discontinued and Cefazolin started.      #Single penile lesion  The penile lesion may represent a secondary autoinoculated site, though other causes must be ruled out. Syphilis, HIV, gonorrhea and chlamydia negative.    #LANI  Estimated Creatinine Clearance: 56.5 mL/min (A) (by C-G formula based on SCr of 1.81 mg/dL (H)).    #Coccygeal ulcer  Decubitus ulcer?.  Patient states that he is usually very active and does not spend time laying in the bed.  No signs of infection around the wound     Recommendations:     -dc cefazolin  -Start zyvox 600 BID due to eliminate the abx induced kidney injury possibility  -Wound care consulted  -Will continue to follow      Onel Chen MD  Date of service: 7/3/2025  Time of service: 1:15 PM      Subjective   Interval History: No acute events overnight.  Patient denies any complaint.  Reports improvement in the redness and pain        Review of Systems  Denies: fever, chills, nausea, vomiting, diarrhea, dysuria    Objective   Range of Vitals (last 24 hours)  Heart Rate:  [59-69]   Temp:  [36.1 °C (97 °F)-36.8 °C (98.3 °F)]   Resp:  [16-18]   BP: (154-185)/()   SpO2:  [95 %-98 %]  Daily Weight  07/02/25 : 95.3 kg (210 lb 1.6 oz)   Body mass index is 28.49 kg/m².    General: alert, oriented, NAD  HEENT: No conjunctival pallor, no scleral icterus, no oral ulcers  Abdomen: soft, non tender, non distended, BS+  Neuro: AAO x 3, PERRL, CN grossly intact     : Superficial ulcer along left side of the penile base. Pic in chart.     Skin: Significant improvement in the erythema around  the ulcers      Antibiotics  linezolid - 600 mg/300 mL      Relevant Results  Labs  Results from last 72 hours   Lab Units 07/03/25  0436 07/01/25  0436 06/30/25  1850   WBC AUTO x10*3/uL 10.3 10.4 14.0*   HEMOGLOBIN g/dL 15.2 13.1* 16.9   HEMATOCRIT % 44.0 37.1* 46.7   PLATELETS AUTO x10*3/uL 281 239 311   NEUTROS PCT AUTO % 68.3  --  79.2   LYMPHS PCT AUTO % 20.3  --  13.2   MONOS PCT AUTO % 9.4  --  6.3   EOS PCT AUTO % 1.1  --  0.4     Results from last 72 hours   Lab Units 07/03/25  0436 07/02/25  1521 07/02/25  1117   SODIUM mmol/L 136 134* 134*   POTASSIUM mmol/L 3.8 4.0 3.8   CHLORIDE mmol/L 105 104 104   CO2 mmol/L 22 20* 22   BUN mg/dL 15 14 15   CREATININE mg/dL 1.81* 1.78* 1.80*   GLUCOSE mg/dL 108* 119* 110*   CALCIUM mg/dL 9.4 9.6 9.1   ANION GAP mmol/L 13 14 12   EGFR mL/min/1.73m*2 44* 45* 44*     Results from last 72 hours   Lab Units 07/03/25  0436 07/02/25  1117 07/01/25  0436   ALK PHOS U/L 75 81 56   BILIRUBIN TOTAL mg/dL 0.5 0.5 0.5   PROTEIN TOTAL g/dL 6.9 7.5 5.2*   ALT U/L 8* 11 10   AST U/L 16 20 14   ALBUMIN g/dL 3.7 3.9 3.0*     Estimated Creatinine Clearance: 56.5 mL/min (A) (by C-G formula based on SCr of 1.81 mg/dL (H)).  C-Reactive Protein   Date Value Ref Range Status   06/30/2025 4.84 (H) <1.00 mg/dL Final       Microbiology  Susceptibility data from last 14 days.  Collected Specimen Info Organism   07/01/25 Tissue/Biopsy from Skin/Superficial Abscess Streptococcus pyogenes (Group A Streptococcus)   06/30/25 Tissue/Biopsy from Wound/Tissue Streptococcus pyogenes (Group A Streptococcus)       Imaging    No results found.

## 2025-07-03 NOTE — CARE PLAN
Problem: Discharge Planning  Goal: Discharge to home or other facility with appropriate resources  Outcome: Progressing  Flowsheets (Taken 7/3/2025 1718)  Discharge to home or other facility with appropriate resources: Identify barriers to discharge with patient and caregiver  Note: Patient expected to discharge back to home once BP is under control.     Problem: Pain - Adult  Goal: Verbalizes/displays adequate comfort level or baseline comfort level  Outcome: Met  Flowsheets (Taken 7/3/2025 1718)  Verbalizes/displays adequate comfort level or baseline comfort level: Encourage patient to monitor pain and request assistance  Note:  No complaints of pain throughout duration of shift     Problem: Chronic Conditions and Co-morbidities  Goal: Patient's chronic conditions and co-morbidity symptoms are monitored and maintained or improved  Flowsheets (Taken 7/3/2025 1718)  Care Plan - Patient's Chronic Conditions and Co-Morbidity Symptoms are Monitored and Maintained or Improved:   Monitor and assess patient's chronic conditions and comorbid symptoms for stability, deterioration, or improvement   Collaborate with multidisciplinary team to address chronic and comorbid conditions and prevent exacerbation or deterioration  Note: Nephrology consulted and saw patient today for elevated lab values. Going to follow up outpatient.     The patient's goals for the shift include see wound care nurse for orders to dress wound and get discharged.     The clinical goals for the shift include Pt will remain injury free throughout entire shift    Over the shift, the patient did not make progress toward the following goals. Barriers to progression include elevated blood pressure. Patient did see wound care nurse and got orders to address lesions on hands and penis.  Recommendations to address these barriers include prn BP medications given for proper parameters.    Patient assessed and vitals below:     Temp: 97.8f  HR: 64   Resp rate:  18   BP: 160/89 (after Prn medications given)   SpO2:  98     Patient met with wound care nurse to dress wounds. Encouraged to keep hands covered when he leaves and goes back to work to promote healing. Patient aware and educated. LR continued at 100 ml/hr. BP elevated consistently in the 170's throughout the duration of the shift. PRN medications given. Recent /89. Patient expected to be discharged upon getting BP under control. Nephrology saw patient for concern due to elevated creatinine of 1.80. Going to follow up outpatient to further address this. Will continue to monitor patient until discharge.     07/03/25 at 5:25 PM - SHELL JORGENSEN RN

## 2025-07-03 NOTE — CARE PLAN
The clinical goals for the shift include Patient weill remain hemodynamically stable throughout shift.    Problem: Pain - Adult  Goal: Verbalizes/displays adequate comfort level or baseline comfort level  Outcome: Progressing     Problem: Safety - Adult  Goal: Free from fall injury  Outcome: Progressing     Problem: Discharge Planning  Goal: Discharge to home or other facility with appropriate resources  Outcome: Progressing     Problem: Chronic Conditions and Co-morbidities  Goal: Patient's chronic conditions and co-morbidity symptoms are monitored and maintained or improved  Outcome: Progressing     Problem: Nutrition  Goal: Nutrient intake appropriate for maintaining nutritional needs  Outcome: Progressing

## 2025-07-03 NOTE — DISCHARGE SUMMARY
Discharge Diagnosis  Cellulitis of upper extremity, unspecified laterality   LANI   Hypertensive urgency      Issues Requiring Follow-Up      Discharge Meds     Medication List      ASK your doctor about these medications     aspirin-acetaminophen-caffeine 250-250-65 mg tablet; Commonly known as:   Excedrin Migraine   multivitamin tablet   turmeric 400 mg capsule       Test Results Pending At Discharge  Pending Labs       Order Current Status    Blood Culture Preliminary result    Blood Culture Preliminary result            Hospital Course  Gopal Moreau is a 55 y.o. male with no significant PMHx presenting with wound on his right hand and his penis.  He stated that there was a wound to the side of his right third fingernails a couple weeks ago and later started developing lesion on his right second finger and left fifth finger and now he notes of a lesion to the lateral aspect of his penis. Patient notes that the lesions are painless and he started noticing the swelling of his right hand with redness tracking up to his arm today.  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 He notes he has been putting on hydrogen peroxide and alcohol swabs without improvement. He denies any new sexual partners, history of STDs.  Denies animal bites, gardening.  Denies fever chills, nausea vomiting, chest pain, short of breath, penile discharge, numbness or motor limitation in his hands.  He is left-hand dominant.  Denies IV drug use.     ED Course:   Vitals on presentation: T 37.4 °C (99.3 °F)  HR 91  BP (!) 182/94  RR 16  O2 98 % None (Room air)  Labs:   CBC with WBC 14.0, Hgb 16.9, Plts 311.   CMP with glucose 103, Na 133, K 3.5, BUN 8, sCr 0.94, alk phos 87, ALT 15, AST 18, bilirubin 0.7.   CRP 4.84, sed rate 36  EKG: None  Imaging - agree with radiology interpretation(s): None  Interventions: Started on Zosyn, vancomycin, admission for further  management  7/1: Patient was seen and examined.  Redness and swelling of the right hand is improving today.  Blood cultures are still pending.  Continue current IV antibiotics.  Seen by ID who has added gonorrhea, chlamydia and HIV screening.  Will continue to trend CBC and BMP daily.  7/2:Patient was seen and examined. Ulcer and cellulitis improving. Blood cultures are negative X 1.  HIV, chlamydia, coronary and negative.  Creatinine bumped today and etiology unclear could be prerenal.  Started on IV fluids.  Will get renal ultrasound urine eosinophil urine sodium urine urea and urine urine creatinine.  Repeat BMP today at 1500.  .  Likely change antibiotics to oral if creatinine improves and cultures remain negative.  Discussed with ID about potential cefazolin associated AIN/ATN.  Vancomycin and Zosyn could also contribute to nephrotoxicity.  Currently off Vanco we will continue cefazolin for now. Continue to trend CBC and BMP daily  7/3: Patient was seen and examined.  Blood cultures are negative times today.  Patient feels clinically better.  Creatinine however remains elevated.FeNa calculation suggestive of intrinsic renal disease.  Renal ultrasound shows normal appearance of the kidneys sonographically.  Resumed IV fluid LR.  Urine eosinophils is negative.  Nephrologist consulted.  Will continue to trend CBC and BMP daily.  Addendum discussed with nephrologist who signed off and recommended patient follow-up outpatient.  Patient was discharged in stable condition to follow-up as outpatient with primary care physician within 1 week of discharge and with nephrologist as scheduled.      The discharge process took about 35 minutes.    Pertinent Physical Exam At Time of Discharge  Physical Exam  Vitals:    07/03/25 0806   BP: (!) 174/91   Pulse: 69   Resp: 18   Temp: 36.3 °C (97.4 °F)   SpO2: 97%       Outpatient Follow-Up  No future appointments.      Eusebio Salcido MD

## 2025-07-05 LAB
BACTERIA BLD CULT: NORMAL
BACTERIA BLD CULT: NORMAL

## 2025-07-08 ENCOUNTER — PATIENT OUTREACH (OUTPATIENT)
Dept: PRIMARY CARE | Facility: CLINIC | Age: 55
End: 2025-07-08
Payer: COMMERCIAL

## 2025-07-08 NOTE — PROGRESS NOTES
TCM completed 07/08/25   Discharge Facility:  [facility]  Discharge Diagnosis: Cellulitis of upper extremity, unspecified laterality,    LANI, Hypertensive urgency  Admission Date: 6/30/25  Discharge Date: 7/3/25  Discharge Disposition: Home     PCP Appointment Date:   7/21/25         (Needs seen by: 7/16)  Specialist Appointment Date:  ID- 7/14/25      Hospital Encounter and Summary Linked: Yes  ED to Hosp-Admission (Discharged) with Eusebio Salcido MD; Betsy Martinez MD (06/30/2025)                                     Unable to reach patient; Two attempts were made to reach patient within two business days after discharge. Voicemail left with contact information for patient to call back with any non-emergent questions or concerns. No return call as of this note.  Needs seen by: 7/16/25.

## 2025-07-14 ENCOUNTER — OFFICE VISIT (OUTPATIENT)
Dept: INFECTIOUS DISEASES | Facility: HOSPITAL | Age: 55
End: 2025-07-14
Payer: COMMERCIAL

## 2025-07-14 VITALS
DIASTOLIC BLOOD PRESSURE: 90 MMHG | HEART RATE: 72 BPM | RESPIRATION RATE: 18 BRPM | OXYGEN SATURATION: 96 % | SYSTOLIC BLOOD PRESSURE: 156 MMHG

## 2025-07-14 DIAGNOSIS — L03.119 CELLULITIS OF UPPER EXTREMITY, UNSPECIFIED LATERALITY: Primary | ICD-10-CM

## 2025-07-14 ASSESSMENT — COLUMBIA-SUICIDE SEVERITY RATING SCALE - C-SSRS
1. IN THE PAST MONTH, HAVE YOU WISHED YOU WERE DEAD OR WISHED YOU COULD GO TO SLEEP AND NOT WAKE UP?: NO
2. HAVE YOU ACTUALLY HAD ANY THOUGHTS OF KILLING YOURSELF?: NO

## 2025-07-14 ASSESSMENT — PATIENT HEALTH QUESTIONNAIRE - PHQ9
2. FEELING DOWN, DEPRESSED OR HOPELESS: NOT AT ALL
1. LITTLE INTEREST OR PLEASURE IN DOING THINGS: NOT AT ALL
SUM OF ALL RESPONSES TO PHQ9 QUESTIONS 1 AND 2: 0

## 2025-07-14 NOTE — PROGRESS NOTES
Infectious Diseases Clinic Follow Up Note:        Follow Up Reason: cellulitis      Assessment/Plan:     #Cellulitis of bilateral upper extremities  #Single penile lesion  The penile lesion may represent a secondary autoinoculated site. Cultures positive for group A strep and soft tissue infection with autoinoculation, likely complicated by lymphangitis.  I     #Coccygeal ulcer  Decubitus ulcer?.  Patient states that he is usually very active and does not spend time laying in the bed.  No signs of infection around the wound     Recommendations:     -s/p cefazolin followed by zyvox  -follow up as needed      Onel Chen MD  Date of service: 7/14/2025  Time of service: 4:18 PM      History Of Present Illness  Gopal Moreau is a 55 y.o. male with no past medical history presents with superficial ulcerations on both hands and a single, painless penile lesion. He reports that the hand lesions began 1-2 weeks ago, initially appearing on his right middle finger and subsequently spreading to the right fourth finger, second knuckle, and left fifth finger. He believes the hand lesions developed prior to the penile ulcer and notes that he had scratched his genital area with his right hand. He applied over-the-counter ointments and hydrogen peroxide without improvement. Over the past few days, he noticed red streaking up his right upper extremity, prompting evaluation at an urgent care, which referred him to the ED for further work-up.     Wound cultures positive for group A strep and patient was treated with cefazolin.  Patient then developed acute kidney injury which was thought to be less likely due to antibiotics and patient was discharged on Zyvox        Medications  Home medications:  Prescriptions Prior to Admission[1]      Review of Systems     Constitutional:  Denies appetite change, chills, fatigue, fever.  HENT:  Denies ear discharge, sore throat    Eyes:  Denies photophobia, eye drainage  Respiratory:   "Denies cough, chest tightness, SOB  Cardiovascular:  Denies chest pain, palpitations  Gastrointestinal:  Denies abdominal pain, diarrhea, nausea, vomiting.   Genitourinary:  Denies dysuria, flank pain, frequency  Musculoskeletal:  Denies joint pain  Skin: Reports improving rash and ulcer  Neurological:  Denies light-headedness, numbness and headaches.    Objective  Range of Vitals (last 24 hours)  [unfilled]  Daily Weight  07/02/25 : 95.3 kg (210 lb 1.6 oz)    There is no height or weight on file to calculate BMI.     Physical Exam  /90 (BP Location: Right arm, Patient Position: Sitting, BP Cuff Size: Adult)   Pulse 72   Resp 18   SpO2 96%   @TMAX(24)@    General: alert, oriented, NAD  HEENT: No conjunctival pallor, no scleral icterus, no oral ulcers  Abdomen: soft, non tender, non distended, BS+  Neuro: AAO x 3, PERRL, CN grossly intact  Skin: Significant improvement in the erythema around the ulcers b/l hands     Relevant Results    Labs              CrCl cannot be calculated (Patient's most recent lab result is older than the maximum 7 days allowed.).  C-Reactive Protein   Date Value Ref Range Status   06/30/2025 4.84 (H) <1.00 mg/dL Final     Sedimentation Rate   Date Value Ref Range Status   06/30/2025 36 (H) 0 - 20 mm/h Final     HIV 1/2 Antigen/Antibody Screen with Reflex to Confirmation   Date Value Ref Range Status   07/01/2025 Nonreactive Nonreactive Final     No results found for: \"HEPCABINIT\", \"HEPCAB\", \"HCVPCRQUANT\"    Microbiology  Susceptibility data from last 14 days.  Collected Specimen Info Organism   07/01/25 Tissue/Biopsy from Skin/Superficial Abscess Streptococcus pyogenes (Group A Streptococcus)   06/30/25 Tissue/Biopsy from Wound/Tissue Streptococcus pyogenes (Group A Streptococcus)       Imaging  US renal complete  Result Date: 7/2/2025  Normal appearance of the kidneys sonographically. No hydronephrosis.   MACRO: None   Signed by: Yamile Antonio 7/2/2025 3:28 PM Dictation workstation:  "  [id number]             [1] (Not in a hospital admission)

## 2025-07-14 NOTE — LETTER
07/14/25    Anish Macias DO  [address]  [place] [address]      Dear Dr. Anish Macias DO,    I am writing to confirm that your patient, Gopal Moreau, received care in my office on 07/14/25. I have enclosed a summary of the care provided to Gopal for your reference.    Please contact me with any questions you may have regarding the visit.    Sincerely,         Onel Chen MD  [address]  [place] [facility] [place] [address] [phone]    CC: No Recipients

## 2025-07-16 NOTE — DOCUMENTATION CLARIFICATION NOTE
"    PATIENT:               BRI HUMPHRIES  ACCT #:                  [id number]  MRN:                       [id number]  :                       1970  ADMIT DATE:       2025 6:15 PM  DISCH DATE:        7/3/2025 7:08 PM  RESPONDING PROVIDER #:        [id number]          PROVIDER RESPONSE TEXT:    Sepsis has been ruled out for this admission    CDI QUERY TEXT:    Clarification    Instruction:    Based on your assessment of the patient and the clinical information, please provide the requested documentation by clicking on the appropriate radio button and enter any additional information if prompted.    Question: Based on your medical judgment, can you please clarify which of these conditions is the most clinically supported    When answering this query, please exercise your independent professional judgment. The fact that a question is being asked, does not imply that any particular answer is desired or expected.    The patient's clinical indicators include:  Clinical Information: There is conflicting documentation in the medical record which requires clarification.    Per H and P, \"Does not meet sepsis criteria admission.\"    Per ID consult, \"Sepsis- Cellulitis of bilateral upper extremities.\"    Clinical Indicators: Per H and P, \"Cellulitis of upper extremity with lymphangitis  Painless ulcer in left lateral penis  -elevated CRP/ESR, leukocytosis  -Continue IV Zosyn and vancomycin  -Syphilis screen, blood cultures x 2, tissue/wound culture pending  -ID consult  -Does not meet sepsis criteria admission.  Hypertensive urgency.\"    Per ID consult, \"Sepsis  Cellulitis of bilateral upper extremities.\"    Per nephro consult, \"Likely has septic ATN which should resolve.\"    : T 99.3, P 74-91, R 16, /94, satting 98% on RA, WBC 14.0, abs neutrophils 11.05     wound culture: (4+) Abundant Streptococcus pyogenes (Group A Streptococcus)    Treatment: IV ancef- now d/aby, IV zosyn- now d/aby, IV vanco- " completed, PO zyvox- active, blood cultures, wound cultures, ID consult, nephro consult, monitoring labs/vitals    Risk Factors: 55yom admitted with cellulitis with noted LANI/ATN and HTN urgency  Options provided:  -- Sepsis with acute cardiac organ dysfunction of HTN urgency, POA, ruled in  -- Sepsis has been ruled out for this admission  -- Other - I will add my own diagnosis  -- Refer to Clinical Documentation Reviewer    Query created by: Yamile Ruiz on 7/8/2025 4:28 PM      Electronically signed by:  BARAK HARRINGTON MD 7/16/2025 9:59 AM

## 2025-07-21 ENCOUNTER — APPOINTMENT (OUTPATIENT)
Dept: PRIMARY CARE | Facility: CLINIC | Age: 55
End: 2025-07-21
Payer: COMMERCIAL

## 2025-07-27 ASSESSMENT — ENCOUNTER SYMPTOMS: HYPERTENSION: 1

## 2025-07-28 ENCOUNTER — APPOINTMENT (OUTPATIENT)
Dept: PRIMARY CARE | Facility: CLINIC | Age: 55
End: 2025-07-28
Payer: COMMERCIAL

## 2025-07-28 VITALS
DIASTOLIC BLOOD PRESSURE: 80 MMHG | OXYGEN SATURATION: 97 % | SYSTOLIC BLOOD PRESSURE: 150 MMHG | BODY MASS INDEX: 28.85 KG/M2 | WEIGHT: 213 LBS | HEIGHT: 72 IN | TEMPERATURE: 98.5 F | HEART RATE: 76 BPM

## 2025-07-28 DIAGNOSIS — I10 PRIMARY HYPERTENSION: Primary | ICD-10-CM

## 2025-07-28 DIAGNOSIS — N17.9 AKI (ACUTE KIDNEY INJURY): ICD-10-CM

## 2025-07-28 PROCEDURE — 99214 OFFICE O/P EST MOD 30 MIN: CPT | Performed by: FAMILY MEDICINE

## 2025-07-28 PROCEDURE — 3008F BODY MASS INDEX DOCD: CPT | Performed by: FAMILY MEDICINE

## 2025-07-28 PROCEDURE — 3079F DIAST BP 80-89 MM HG: CPT | Performed by: FAMILY MEDICINE

## 2025-07-28 PROCEDURE — 3077F SYST BP >= 140 MM HG: CPT | Performed by: FAMILY MEDICINE

## 2025-07-28 RX ORDER — HYDRALAZINE HYDROCHLORIDE 100 MG/1
100 TABLET, FILM COATED ORAL 2 TIMES DAILY
Qty: 180 TABLET | Refills: 0 | Status: SHIPPED | OUTPATIENT
Start: 2025-07-28 | End: 2025-10-26

## 2025-07-28 RX ORDER — CLONIDINE HYDROCHLORIDE 0.1 MG/1
0.1 TABLET ORAL 2 TIMES DAILY
Qty: 180 TABLET | Refills: 0 | Status: SHIPPED | OUTPATIENT
Start: 2025-07-28 | End: 2025-10-26

## 2025-07-28 RX ORDER — AMLODIPINE BESYLATE 10 MG/1
10 TABLET ORAL DAILY
Qty: 90 TABLET | Refills: 0 | Status: SHIPPED | OUTPATIENT
Start: 2025-07-28 | End: 2025-10-26

## 2025-07-28 ASSESSMENT — PATIENT HEALTH QUESTIONNAIRE - PHQ9
SUM OF ALL RESPONSES TO PHQ9 QUESTIONS 1 AND 2: 0
2. FEELING DOWN, DEPRESSED OR HOPELESS: NOT AT ALL
1. LITTLE INTEREST OR PLEASURE IN DOING THINGS: NOT AT ALL

## 2025-07-28 ASSESSMENT — ENCOUNTER SYMPTOMS: HYPERTENSION: 1

## 2025-07-28 NOTE — PROGRESS NOTES
"Subjective   Patient ID: Gopal Moreau is a 55 y.o. male who presents for Follow-up and Hypertension.    Hypertension  The current episode started more than 1 month ago. The problem is unchanged. The problem is resistant. Associated symptoms include anxiety and peripheral edema. Risk factors for coronary artery disease include smoking/tobacco exposure and stress. There are no compliance problems.     Patient was admitted into [facility] on 6/30/2025, released on 7/3/2025 for hypertension. Patient was released from the hospital with Rx of Amlodipine, Hydralazine, Clonidine. Denies chest pain, dizziness, SOB and headaches. Has not checked his blood pressure outside the office.     Review of Systems   All other systems reviewed and are negative.      Objective   /80   Pulse 76   Temp 36.9 °C (98.5 °F)   Ht 1.829 m (6' 0.01\")   Wt 96.6 kg (213 lb)   SpO2 97%   BMI 28.88 kg/m²     Physical Exam  Constitutional:       Appearance: Normal appearance.     Eyes:      Conjunctiva/sclera: Conjunctivae normal.       Cardiovascular:      Rate and Rhythm: Normal rate and regular rhythm.   Pulmonary:      Effort: Pulmonary effort is normal.      Breath sounds: Normal breath sounds.   Abdominal:      Palpations: Abdomen is soft.     Musculoskeletal:         General: Normal range of motion.     Skin:     General: Skin is warm and dry.     Neurological:      Mental Status: He is alert.     Psychiatric:         Mood and Affect: Mood normal.         Assessment/Plan   Diagnoses and all orders for this visit:  Primary hypertension  -     amLODIPine (Norvasc) 10 mg tablet; Take 1 tablet (10 mg) by mouth once daily.  -     cloNIDine (Catapres) 0.1 mg tablet; Take 1 tablet (0.1 mg) by mouth 2 times a day.  -     hydrALAZINE (Apresoline) 100 mg tablet; Take 1 tablet (100 mg) by mouth 2 times a day.  -     Comprehensive metabolic panel; Future  LANI (acute kidney injury)  -     amLODIPine (Norvasc) 10 mg tablet; Take 1 tablet " (10 mg) by mouth once daily.  -     cloNIDine (Catapres) 0.1 mg tablet; Take 1 tablet (0.1 mg) by mouth 2 times a day.  -     hydrALAZINE (Apresoline) 100 mg tablet; Take 1 tablet (100 mg) by mouth 2 times a day.  -     Comprehensive metabolic panel; Future    Will get repeat CMP and check GFR. He will keep current appointment scheduled with nephrology at this time.   We will see him back in 3 months/PRN

## 2025-08-01 LAB
ALBUMIN SERPL-MCNC: 4.3 G/DL (ref 3.6–5.1)
ALP SERPL-CCNC: 113 U/L (ref 35–144)
ALT SERPL-CCNC: 13 U/L (ref 9–46)
ANION GAP SERPL CALCULATED.4IONS-SCNC: 8 MMOL/L (CALC) (ref 7–17)
AST SERPL-CCNC: 14 U/L (ref 10–35)
BILIRUB SERPL-MCNC: 0.3 MG/DL (ref 0.2–1.2)
BUN SERPL-MCNC: 12 MG/DL (ref 7–25)
CALCIUM SERPL-MCNC: 9.5 MG/DL (ref 8.6–10.3)
CHLORIDE SERPL-SCNC: 101 MMOL/L (ref 98–110)
CO2 SERPL-SCNC: 26 MMOL/L (ref 20–32)
CREAT SERPL-MCNC: 0.92 MG/DL (ref 0.7–1.3)
EGFRCR SERPLBLD CKD-EPI 2021: 99 ML/MIN/1.73M2
GLUCOSE SERPL-MCNC: 101 MG/DL (ref 65–99)
POTASSIUM SERPL-SCNC: 4.5 MMOL/L (ref 3.5–5.3)
PROT SERPL-MCNC: 7.3 G/DL (ref 6.1–8.1)
SODIUM SERPL-SCNC: 135 MMOL/L (ref 135–146)

## (undated) DEVICE — GLOVE, PROTEXIS PI CLASSIC, SZ-6.5, PF, LF

## (undated) DEVICE — PAD, GROUNDING, ELECTROSURGICAL, DUAL

## (undated) DEVICE — DRAPE, SHEET, THYROID, W/ARMBOARD COVER, 100 X 121 IN, DISPOSABLE, LF, STERILE

## (undated) DEVICE — SYRINGE, 10 CC, LUER LOCK

## (undated) DEVICE — GLOVE, SURGICAL, PROTEXIS PI BLUE W/NEUTHERA, 6.5, PF, LF

## (undated) DEVICE — SUTURE, VICRYL, 4-0, 18 IN, UNDYED BR PS-2

## (undated) DEVICE — GOWN, SURGICAL, UROLOGY, IMPERVIOUS, XLONG, XLARGE, DISPOSABLE

## (undated) DEVICE — PAD, GROUNDING, ELECTROSURGICAL, W/9 FT CABLE, POLYHESIVE II, ADULT, LF

## (undated) DEVICE — SPONGE, LAP, XRAY DECT, 4IN X 18IN, W/MASTER DMT, STERILE

## (undated) DEVICE — TISSUE ADHESIVE, EXOFIN, 1ML

## (undated) DEVICE — SOLUTION, IRRIGATION, SODIUM CHLORIDE 0.9%, 1000 ML, POUR BOTTLE

## (undated) DEVICE — COVER HANDLE LIGHT, STERIS, BLUE, STERILE

## (undated) DEVICE — NEEDLE, HYPODERMIC, REGULAR WALL, REGULAR BEVEL, 22 G X 1.5 IN

## (undated) DEVICE — SUTURE, VICRYL, 2-0, 27 IN, SH, UNDYED

## (undated) DEVICE — SUTURE, VICRYL, 3-0, 27 IN, SH

## (undated) DEVICE — Device

## (undated) DEVICE — APPLICATOR, CHLORAPREP, W/ORANGE TINT, 26ML